# Patient Record
Sex: MALE | Race: WHITE | Employment: OTHER | URBAN - METROPOLITAN AREA
[De-identification: names, ages, dates, MRNs, and addresses within clinical notes are randomized per-mention and may not be internally consistent; named-entity substitution may affect disease eponyms.]

---

## 2021-10-09 PROBLEM — R10.9 ABDOMINAL PAIN: Status: ACTIVE | Noted: 2021-10-09

## 2021-10-10 ENCOUNTER — APPOINTMENT (OUTPATIENT)
Dept: MRI IMAGING | Age: 66
DRG: 251 | End: 2021-10-10
Attending: INTERNAL MEDICINE
Payer: MEDICAID

## 2021-10-10 ENCOUNTER — HOSPITAL ENCOUNTER (INPATIENT)
Age: 66
LOS: 6 days | Discharge: NURSING, REHAB OR LONG-TERM CARE | DRG: 251 | End: 2021-10-16
Attending: INTERNAL MEDICINE | Admitting: INTERNAL MEDICINE
Payer: MEDICAID

## 2021-10-10 PROBLEM — I48.91 ATRIAL FIBRILLATION (HCC): Status: ACTIVE | Noted: 2021-10-10

## 2021-10-10 PROBLEM — G81.94 LEFT HEMIPARESIS (HCC): Status: ACTIVE | Noted: 2021-10-10

## 2021-10-10 PROBLEM — I95.89 HYPOTENSION DUE TO HYPOVOLEMIA: Status: ACTIVE | Noted: 2021-10-10

## 2021-10-10 PROBLEM — I10 ESSENTIAL HYPERTENSION: Status: ACTIVE | Noted: 2021-10-10

## 2021-10-10 PROBLEM — K80.50 CHOLEDOCHOLITHIASIS: Status: ACTIVE | Noted: 2021-10-10

## 2021-10-10 PROBLEM — Z86.73 HISTORY OF COMPLETED STROKE: Status: ACTIVE | Noted: 2021-10-10

## 2021-10-10 PROBLEM — E86.1 HYPOTENSION DUE TO HYPOVOLEMIA: Status: ACTIVE | Noted: 2021-10-10

## 2021-10-10 PROBLEM — I25.10 CORONARY ARTERY DISEASE INVOLVING NATIVE CORONARY ARTERY OF NATIVE HEART WITHOUT ANGINA PECTORIS: Status: ACTIVE | Noted: 2021-10-10

## 2021-10-10 LAB
ALBUMIN SERPL-MCNC: 3 G/DL (ref 3.5–5.2)
ALBUMIN/GLOBULIN RATIO: 1 (ref 1–2.5)
ALP BLD-CCNC: 241 U/L (ref 40–129)
ALT SERPL-CCNC: 119 U/L (ref 5–41)
AMYLASE: 35 U/L (ref 28–100)
ANION GAP SERPL CALCULATED.3IONS-SCNC: 12 MMOL/L (ref 9–17)
AST SERPL-CCNC: 62 U/L
BILIRUB SERPL-MCNC: 9.09 MG/DL (ref 0.3–1.2)
BUN BLDV-MCNC: 15 MG/DL (ref 8–23)
BUN/CREAT BLD: ABNORMAL (ref 9–20)
CALCIUM SERPL-MCNC: 8.6 MG/DL (ref 8.6–10.4)
CHLORIDE BLD-SCNC: 102 MMOL/L (ref 98–107)
CO2: 24 MMOL/L (ref 20–31)
CREAT SERPL-MCNC: 0.63 MG/DL (ref 0.7–1.2)
GFR AFRICAN AMERICAN: >60 ML/MIN
GFR NON-AFRICAN AMERICAN: >60 ML/MIN
GFR SERPL CREATININE-BSD FRML MDRD: ABNORMAL ML/MIN/{1.73_M2}
GFR SERPL CREATININE-BSD FRML MDRD: ABNORMAL ML/MIN/{1.73_M2}
GLUCOSE BLD-MCNC: 89 MG/DL (ref 70–99)
HCT VFR BLD CALC: 35.3 % (ref 40.7–50.3)
HEMOGLOBIN: 11.8 G/DL (ref 13–17)
INR BLD: 1.1
LIPASE: 21 U/L (ref 13–60)
MCH RBC QN AUTO: 32 PG (ref 25.2–33.5)
MCHC RBC AUTO-ENTMCNC: 33.4 G/DL (ref 28.4–34.8)
MCV RBC AUTO: 95.7 FL (ref 82.6–102.9)
NRBC AUTOMATED: 0 PER 100 WBC
PDW BLD-RTO: 14 % (ref 11.8–14.4)
PHENYTOIN FREE: <0.1 UG/ML (ref 1–2)
PLATELET # BLD: ABNORMAL K/UL (ref 138–453)
PLATELET, FLUORESCENCE: NORMAL K/UL (ref 138–453)
PLATELET, IMMATURE FRACTION: NORMAL % (ref 1.1–10.3)
PMV BLD AUTO: ABNORMAL FL (ref 8.1–13.5)
POTASSIUM SERPL-SCNC: 3.7 MMOL/L (ref 3.7–5.3)
PROTHROMBIN TIME: 11.6 SEC (ref 9.1–12.3)
RBC # BLD: 3.69 M/UL (ref 4.21–5.77)
SODIUM BLD-SCNC: 138 MMOL/L (ref 135–144)
TOTAL PROTEIN: 6 G/DL (ref 6.4–8.3)
WBC # BLD: 1.9 K/UL (ref 3.5–11.3)

## 2021-10-10 PROCEDURE — 99222 1ST HOSP IP/OBS MODERATE 55: CPT | Performed by: INTERNAL MEDICINE

## 2021-10-10 PROCEDURE — 82150 ASSAY OF AMYLASE: CPT

## 2021-10-10 PROCEDURE — 87086 URINE CULTURE/COLONY COUNT: CPT

## 2021-10-10 PROCEDURE — A9576 INJ PROHANCE MULTIPACK: HCPCS | Performed by: NURSE PRACTITIONER

## 2021-10-10 PROCEDURE — 36415 COLL VENOUS BLD VENIPUNCTURE: CPT

## 2021-10-10 PROCEDURE — 1200000000 HC SEMI PRIVATE

## 2021-10-10 PROCEDURE — 85610 PROTHROMBIN TIME: CPT

## 2021-10-10 PROCEDURE — 85027 COMPLETE CBC AUTOMATED: CPT

## 2021-10-10 PROCEDURE — 6360000004 HC RX CONTRAST MEDICATION: Performed by: NURSE PRACTITIONER

## 2021-10-10 PROCEDURE — 85055 RETICULATED PLATELET ASSAY: CPT

## 2021-10-10 PROCEDURE — 80186 ASSAY OF PHENYTOIN FREE: CPT

## 2021-10-10 PROCEDURE — 74183 MRI ABD W/O CNTR FLWD CNTR: CPT

## 2021-10-10 PROCEDURE — 6360000002 HC RX W HCPCS: Performed by: NURSE PRACTITIONER

## 2021-10-10 PROCEDURE — 99253 IP/OBS CNSLTJ NEW/EST LOW 45: CPT | Performed by: NURSE PRACTITIONER

## 2021-10-10 PROCEDURE — 2580000003 HC RX 258: Performed by: NURSE PRACTITIONER

## 2021-10-10 PROCEDURE — 80053 COMPREHEN METABOLIC PANEL: CPT

## 2021-10-10 PROCEDURE — 83690 ASSAY OF LIPASE: CPT

## 2021-10-10 RX ORDER — ONDANSETRON 2 MG/ML
4 INJECTION INTRAMUSCULAR; INTRAVENOUS EVERY 6 HOURS PRN
Status: DISCONTINUED | OUTPATIENT
Start: 2021-10-10 | End: 2021-10-17 | Stop reason: HOSPADM

## 2021-10-10 RX ORDER — POTASSIUM BICARBONATE 25 MEQ/1
50 TABLET, EFFERVESCENT ORAL PRN
Status: DISCONTINUED | OUTPATIENT
Start: 2021-10-10 | End: 2021-10-17 | Stop reason: HOSPADM

## 2021-10-10 RX ORDER — SODIUM CHLORIDE 9 MG/ML
25 INJECTION, SOLUTION INTRAVENOUS PRN
Status: DISCONTINUED | OUTPATIENT
Start: 2021-10-10 | End: 2021-10-17 | Stop reason: HOSPADM

## 2021-10-10 RX ORDER — DILTIAZEM HYDROCHLORIDE 180 MG/1
180 CAPSULE, COATED, EXTENDED RELEASE ORAL DAILY
COMMUNITY

## 2021-10-10 RX ORDER — LISINOPRIL 2.5 MG/1
2.5 TABLET ORAL DAILY
Status: ON HOLD | COMMUNITY
End: 2021-10-16 | Stop reason: HOSPADM

## 2021-10-10 RX ORDER — MAGNESIUM SULFATE 1 G/100ML
1000 INJECTION INTRAVENOUS PRN
Status: DISCONTINUED | OUTPATIENT
Start: 2021-10-10 | End: 2021-10-17 | Stop reason: HOSPADM

## 2021-10-10 RX ORDER — DOCUSATE SODIUM 100 MG/1
100 CAPSULE, LIQUID FILLED ORAL DAILY
Status: DISCONTINUED | OUTPATIENT
Start: 2021-10-10 | End: 2021-10-17 | Stop reason: HOSPADM

## 2021-10-10 RX ORDER — POTASSIUM CHLORIDE 7.45 MG/ML
10 INJECTION INTRAVENOUS PRN
Status: DISCONTINUED | OUTPATIENT
Start: 2021-10-10 | End: 2021-10-17 | Stop reason: HOSPADM

## 2021-10-10 RX ORDER — SODIUM CHLORIDE 0.9 % (FLUSH) 0.9 %
10 SYRINGE (ML) INJECTION PRN
Status: DISCONTINUED | OUTPATIENT
Start: 2021-10-10 | End: 2021-10-17 | Stop reason: HOSPADM

## 2021-10-10 RX ORDER — SODIUM CHLORIDE 0.9 % (FLUSH) 0.9 %
5-40 SYRINGE (ML) INJECTION EVERY 12 HOURS SCHEDULED
Status: DISCONTINUED | OUTPATIENT
Start: 2021-10-10 | End: 2021-10-17 | Stop reason: HOSPADM

## 2021-10-10 RX ORDER — ATORVASTATIN CALCIUM 40 MG/1
40 TABLET, FILM COATED ORAL DAILY
COMMUNITY

## 2021-10-10 RX ORDER — ACETAMINOPHEN 650 MG/1
650 SUPPOSITORY RECTAL EVERY 6 HOURS PRN
Status: DISCONTINUED | OUTPATIENT
Start: 2021-10-10 | End: 2021-10-17 | Stop reason: HOSPADM

## 2021-10-10 RX ORDER — FUROSEMIDE 40 MG/1
40 TABLET ORAL DAILY
COMMUNITY

## 2021-10-10 RX ORDER — ASPIRIN 81 MG/1
81 TABLET ORAL DAILY
COMMUNITY

## 2021-10-10 RX ORDER — ACETAMINOPHEN 325 MG/1
650 TABLET ORAL EVERY 4 HOURS PRN
COMMUNITY

## 2021-10-10 RX ORDER — 0.9 % SODIUM CHLORIDE 0.9 %
500 INTRAVENOUS SOLUTION INTRAVENOUS ONCE
Status: DISCONTINUED | OUTPATIENT
Start: 2021-10-10 | End: 2021-10-17 | Stop reason: HOSPADM

## 2021-10-10 RX ORDER — DOCUSATE SODIUM 100 MG/1
100 CAPSULE, LIQUID FILLED ORAL DAILY
COMMUNITY

## 2021-10-10 RX ORDER — OXYCODONE HYDROCHLORIDE AND ACETAMINOPHEN 5; 325 MG/1; MG/1
1 TABLET ORAL 2 TIMES DAILY
Status: ON HOLD | COMMUNITY
End: 2021-10-16 | Stop reason: HOSPADM

## 2021-10-10 RX ORDER — ACETAMINOPHEN 325 MG/1
650 TABLET ORAL EVERY 6 HOURS PRN
Status: DISCONTINUED | OUTPATIENT
Start: 2021-10-10 | End: 2021-10-17 | Stop reason: HOSPADM

## 2021-10-10 RX ORDER — SODIUM CHLORIDE 9 MG/ML
INJECTION, SOLUTION INTRAVENOUS CONTINUOUS
Status: DISCONTINUED | OUTPATIENT
Start: 2021-10-10 | End: 2021-10-12

## 2021-10-10 RX ORDER — ONDANSETRON 4 MG/1
4 TABLET, ORALLY DISINTEGRATING ORAL EVERY 8 HOURS PRN
Status: DISCONTINUED | OUTPATIENT
Start: 2021-10-10 | End: 2021-10-17 | Stop reason: HOSPADM

## 2021-10-10 RX ORDER — POLYETHYLENE GLYCOL 3350 17 G/17G
17 POWDER, FOR SOLUTION ORAL DAILY PRN
Status: DISCONTINUED | OUTPATIENT
Start: 2021-10-10 | End: 2021-10-17 | Stop reason: HOSPADM

## 2021-10-10 RX ORDER — MIRTAZAPINE 15 MG/1
15 TABLET, ORALLY DISINTEGRATING ORAL NIGHTLY
Status: ON HOLD | COMMUNITY
End: 2021-10-16 | Stop reason: HOSPADM

## 2021-10-10 RX ORDER — POTASSIUM CHLORIDE 20 MEQ/1
40 TABLET, EXTENDED RELEASE ORAL PRN
Status: DISCONTINUED | OUTPATIENT
Start: 2021-10-10 | End: 2021-10-17 | Stop reason: HOSPADM

## 2021-10-10 RX ORDER — FAMOTIDINE 20 MG/1
20 TABLET, FILM COATED ORAL DAILY
COMMUNITY

## 2021-10-10 RX ORDER — HYDROXYZINE HYDROCHLORIDE 10 MG/1
10 TABLET, FILM COATED ORAL NIGHTLY PRN
Status: ON HOLD | COMMUNITY
End: 2021-10-16 | Stop reason: HOSPADM

## 2021-10-10 RX ORDER — LORATADINE 10 MG/1
10 CAPSULE, LIQUID FILLED ORAL DAILY
Status: ON HOLD | COMMUNITY
End: 2021-10-16 | Stop reason: HOSPADM

## 2021-10-10 RX ORDER — SODIUM CHLORIDE 0.9 % (FLUSH) 0.9 %
5-40 SYRINGE (ML) INJECTION 2 TIMES DAILY
Status: DISCONTINUED | OUTPATIENT
Start: 2021-10-10 | End: 2021-10-17 | Stop reason: HOSPADM

## 2021-10-10 RX ADMIN — GADOTERIDOL 16 ML: 279.3 INJECTION, SOLUTION INTRAVENOUS at 14:05

## 2021-10-10 RX ADMIN — PIPERACILLIN AND TAZOBACTAM 3375 MG: 3; .375 INJECTION, POWDER, FOR SOLUTION INTRAVENOUS at 12:05

## 2021-10-10 RX ADMIN — PIPERACILLIN AND TAZOBACTAM 3375 MG: 3; .375 INJECTION, POWDER, FOR SOLUTION INTRAVENOUS at 19:48

## 2021-10-10 ASSESSMENT — ENCOUNTER SYMPTOMS
BLOOD IN STOOL: 0
SHORTNESS OF BREATH: 0
PHOTOPHOBIA: 0
ABDOMINAL PAIN: 1
VOMITING: 0
NAUSEA: 0
COUGH: 0
ABDOMINAL DISTENTION: 0
WHEEZING: 0

## 2021-10-10 NOTE — H&P
Nonthrombocytopenic purpura ([facility] [address]     Stroke (cerebrum) [facility])         PSHx:  Past Surgical History:   Procedure Laterality Date    CHOLECYSTECTOMY  12/04/2019    CORONARY ANGIOPLASTY WITH STENT PLACEMENT  12/2017    GASTROSTOMY TUBE PLACEMENT  05/22/2019    KNEE ARTHROSCOPY      TRACHEOSTOMY      TRACHEOSTOMY  05/22/2019        Home Meds:  Prior to Admission medications    Medication Sig Start Date End Date Taking? Authorizing Provider   acetaminophen (TYLENOL) 325 MG tablet Take 650 mg by mouth every 4 hours as needed for Pain   Yes Historical Provider, MD   aspirin EC 81 MG EC tablet Take 81 mg by mouth daily   Yes Historical Provider, MD   atorvastatin (LIPITOR) 40 MG tablet Take 40 mg by mouth daily   Yes Historical Provider, MD   dilTIAZem (CARDIZEM CD) 180 MG extended release capsule Take 180 mg by mouth daily   Yes Historical Provider, MD   vitamin D (CHOLECALCIFEROL) 25 MCG (1000 UT) TABS tablet Take 400 Units by mouth daily   Yes Historical Provider, MD   phenytoin (DILANTIN) 30 MG ER capsule Take 30 mg by mouth daily   Yes Historical Provider, MD   docusate sodium (COLACE) 100 MG capsule Take 100 mg by mouth daily   Yes Historical Provider, MD   furosemide (LASIX) 40 MG tablet Take 40 mg by mouth daily   Yes Historical Provider, MD   hydrOXYzine (ATARAX) 10 MG tablet Take 10 mg by mouth nightly as needed for Itching   Yes Historical Provider, MD   lisinopril (PRINIVIL;ZESTRIL) 2.5 MG tablet Take 2.5 mg by mouth daily   Yes Historical Provider, MD   loratadine (CLARITIN) 10 MG capsule Take 10 mg by mouth daily   Yes Historical Provider, MD   oxyCODONE-acetaminophen (PERCOCET) 5-325 MG per tablet Take 1 tablet by mouth 2 times daily.    Yes Historical Provider, MD   famotidine (PEPCID) 20 MG tablet Take 20 mg by mouth daily   Yes Historical Provider, MD   mirtazapine (REMERON SOL-TAB) 15 MG disintegrating tablet Take 15 mg by mouth nightly   Yes Historical Provider, MD   Bisoprolol Fumarate (ZEBETA PO) Take 20 mg by mouth daily   Yes Historical Provider, MD         Allergies:   Patient has no known allergies. Social Hx:  Tobacco:    reports that he has quit smoking. He has never used smokeless tobacco.  Alcohol:      reports no history of alcohol use. Drug Use:  reports no history of drug use. Family Hx; Family History   Problem Relation Age of Onset    No Known Problems Mother     Heart Disease Father        ROS:  Review of Systems   Constitutional: Positive for activity change (Decreased) and fatigue. Negative for appetite change (Diminished), chills, diaphoresis and fever. HENT: Negative for congestion and nosebleeds. Eyes: Negative for photophobia and visual disturbance. Respiratory: Negative for cough, shortness of breath and wheezing. Cardiovascular: Negative for chest pain and palpitations. Gastrointestinal: Positive for abdominal pain (Right upper quadrant). Negative for abdominal distention, blood in stool, nausea and vomiting. Genitourinary: Negative for flank pain and hematuria. Musculoskeletal: Negative for arthralgias and myalgias. Skin: Negative for rash and wound. Neurological: Positive for weakness (Left hemiparesis following prior stroke). Negative for dizziness and light-headedness. Physical Exam:  Vitals:  BP 92/67   Pulse 78   Temp 97.8 °F (36.6 °C) (Oral)   Resp 18   Ht 6' (1.829 m)   Wt 180 lb 6.4 oz (81.8 kg)   SpO2 97%   BMI 24.47 kg/m²   Temp (24hrs), Av.8 °F (36.6 °C), Min:97.8 °F (36.6 °C), Max:97.8 °F (36.6 °C)    Physical Exam  Vitals reviewed. Constitutional:       General: He is not in acute distress. Appearance: He is ill-appearing. He is not diaphoretic. HENT:      Head: Normocephalic. Nose: Nose normal.   Eyes:      General: Scleral icterus present. Conjunctiva/sclera: Conjunctivae normal.   Neck:      Trachea: No tracheal deviation.       Comments: Prior tracheostomy site stable  Cardiovascular:      Rate and Rhythm: Normal rate and regular rhythm. Pulmonary:      Effort: Pulmonary effort is normal. No respiratory distress. Breath sounds: Normal breath sounds. No wheezing or rales. Chest:      Chest wall: No tenderness. Abdominal:      General: Bowel sounds are normal. There is no distension. Palpations: Abdomen is soft. Tenderness: There is abdominal tenderness (Right upper quadrant, negative Davila's sign). Comments: G-tube site stable   Musculoskeletal:         General: No tenderness. Cervical back: Neck supple. Comments: Decreased muscle mass   Skin:     General: Skin is warm and dry. Coloration: Skin is jaundiced.    Neurological:      Comments: Having trouble with detailed historical data  Moving extremities x4  There is residual left hemiparesis           Data:  Laboratory Testing:  Recent Results (from the past 24 hour(s))   CBC    Collection Time: 10/10/21 10:23 AM   Result Value Ref Range    WBC 1.9 (L) 3.5 - 11.3 k/uL    RBC 3.69 (L) 4.21 - 5.77 m/uL    Hemoglobin 11.8 (L) 13.0 - 17.0 g/dL    Hematocrit 35.3 (L) 40.7 - 50.3 %    MCV 95.7 82.6 - 102.9 fL    MCH 32.0 25.2 - 33.5 pg    MCHC 33.4 28.4 - 34.8 g/dL    RDW 14.0 11.8 - 14.4 %    Platelets See Reflexed IPF Result 138 - 453 k/uL    MPV NOT REPORTED 8.1 - 13.5 fL    NRBC Automated 0.0 0.0 per 100 WBC   Comprehensive Metabolic Panel w/ Reflex to MG    Collection Time: 10/10/21 10:23 AM   Result Value Ref Range    Glucose 89 70 - 99 mg/dL    BUN 15 8 - 23 mg/dL    CREATININE 0.63 (L) 0.70 - 1.20 mg/dL    Bun/Cre Ratio NOT REPORTED 9 - 20    Calcium 8.6 8.6 - 10.4 mg/dL    Sodium 138 135 - 144 mmol/L    Potassium 3.7 3.7 - 5.3 mmol/L    Chloride 102 98 - 107 mmol/L    CO2 24 20 - 31 mmol/L    Anion Gap 12 9 - 17 mmol/L    Alkaline Phosphatase 241 (H) 40 - 129 U/L     (H) 5 - 41 U/L    AST 62 (H) <40 U/L    Total Bilirubin 9.09 (H) 0.3 - 1.2 mg/dL    Total Protein 6.0 (L) 6.4 - 8.3 g/dL    Albumin 3.0 (L) 3.5 - 5.2 g/dL Albumin/Globulin Ratio 1.0 1.0 - 2.5    GFR Non-African American >60 >60 mL/min    GFR African American >60 >60 mL/min    GFR Comment          GFR Staging NOT REPORTED    LIPASE    Collection Time: 10/10/21 10:23 AM   Result Value Ref Range    Lipase 21 13 - 60 U/L   AMYLASE    Collection Time: 10/10/21 10:23 AM   Result Value Ref Range    Amylase 35 28 - 100 U/L   Protime-INR    Collection Time: 10/10/21 10:23 AM   Result Value Ref Range    Protime 11.6 9.1 - 12.3 sec    INR 1.1    Immature Platelet Fraction    Collection Time: 10/10/21 10:23 AM   Result Value Ref Range    Platelet, Immature Fraction NOT REPORTED 1.1 - 10.3 %    Platelet, Fluorescence Platelet clumps present, count appears decreased. 138 - 453 k/uL       Imaging/Diagnostics:  No results found.     Assessment:  Primary Problem  Choledocholithiasis    Active Hospital Problems    Diagnosis Date Noted    Choledocholithiasis [K80.50] 10/10/2021    Hypotension due to hypovolemia [I95.89, E86.1] 10/10/2021    History of completed stroke with residual left hemiparesis [Z86.73] 10/10/2021    Left hemiparesis ([address] [G81.94] 10/10/2021    Essential hypertension [I10] 10/10/2021    Atrial fibrillation ([address] [I48.91] 10/10/2021    Coronary artery disease involving native coronary artery of native heart without angina pectoris [I25.10] 10/10/2021    Stroke (cerebrum) ([address] [I63.9]     Nonthrombocytopenic purpura ([address] [D69.2]     Obstructive jaundice [K83.1]     Leukopenia [D72.819]     Anemia, normocytic normochromic [D64.9]     Abdominal pain [R10.9] 10/09/2021       Plan:  Admit  GI consultation  MRCP pending  Pain control  Aspirin on hold  Blood Pressure - Monitor and control  Antihypertensives on hold  Diuretics on hold  Hydration  Pressors as needed  Antibiotics: Zosyn  Observe for cholangitis  Blood products as needed  Hematology evaluation and follow-up  Check EKG  Review [facility] from [facility] when available  The patient's status and

## 2021-10-10 NOTE — PLAN OF CARE
Problem: Infection:  Goal: Will remain free from infection  Description: Will remain free from infection  Outcome: Ongoing     Problem: Safety:  Goal: Free from accidental physical injury  Description: Free from accidental physical injury  Outcome: Ongoing  Goal: Free from intentional harm  Description: Free from intentional harm  Outcome: Ongoing     Problem: Daily Care:  Goal: Daily care needs are met  Description: Daily care needs are met  Outcome: Ongoing     Problem: Pain:  Goal: Patient's pain/discomfort is manageable  Description: Patient's pain/discomfort is manageable  Outcome: Ongoing     Problem: Skin Integrity:  Goal: Skin integrity will stabilize  Description: Skin integrity will stabilize  Outcome: Ongoing     Problem: Discharge Planning:  Goal: Patients continuum of care needs are met  Description: Patients continuum of care needs are met  Outcome: Ongoing     Problem: Falls - Risk of:  Goal: Will remain free from falls  Description: Will remain free from falls  Outcome: Ongoing  Goal: Absence of physical injury  Description: Absence of physical injury  Outcome: Ongoing     Problem: Skin Integrity:  Goal: Will show no infection signs and symptoms  Description: Will show no infection signs and symptoms  Outcome: Ongoing  Goal: Absence of new skin breakdown  Description: Absence of new skin breakdown  Outcome: Ongoing

## 2021-10-10 NOTE — CONSULTS
THE [facility]  Consultation Note     . REASON FOR CONSULTATION:    CBD dilatation  Evaluate for ERCP    HISTORY OF PRESENT ILLNESS:     This is a 61 y.o. male with PMH including CVA who originally presented to [facility] with complaints of right upper quadrant pain and transferred to [address] [place] [address] Dr RUSS for suspicions of choledochal lithiasis. Patient denies nausea vomiting fever chills. CT scan at [address] [place] [address] showed a 1.5 cm dilated CBD duct with intra and extrahepatic ductal dilatation. Blood work showed transaminasemia Prior to transfer patient was started on Zosyn. Apparently overnight patient had required Levophed prior to transfer  Patient has been living in an ECF for the last 3 years due to CVA. He previously had trach and PEG.    Patient is alert and oriented able to verbalize few words but has limited communication due to previous stroke    Summary of imaging completed at this time:  MRI MRCP pending      Summary of abnormal labs completed at this time:    Metabolic: Unremarkable  Hematology: Hemoglobin was 17 at [facility] and is 11 here-question validity of blood sample  Coags: INR 1.1  Liver profile: T bili was 9 at [facility] and 9 here, , , AST 62, amylase 35, lipase 21      Previous GI history:   Patient has had multiple ERCPs in the past with open cholecystectomy 2019  Unclear if patient has had EGD or colonoscopies in the past-no record in epic or care everywhere  No personal history or family history of GI malignancy  Past Medical/Social/Family History:  Past Medical History:   Diagnosis Date    Atrial fibrillation ([address]     CAD (coronary artery disease)     CHF (congestive heart failure) ([address]     Depression     Hypertension     Nonthrombocytopenic purpura ([address]     Stroke (cerebrum) ([facility] [address]      Past Surgical History:   Procedure Laterality Date    CHOLECYSTECTOMY  12/04/2019    CORONARY ANGIOPLASTY WITH STENT PLACEMENT  12/2017    GASTROSTOMY TUBE PLACEMENT 05/22/2019    KNEE ARTHROSCOPY      TRACHEOSTOMY      TRACHEOSTOMY  05/22/2019     Family History   Problem Relation Age of Onset    No Known Problems Mother     Heart Disease Father      Previous records/history/ and notes were reviewed    Allergies:  No Known Allergies    Home medications:  Prior to Admission medications    Medication Sig Start Date End Date Taking? Authorizing Provider   acetaminophen (TYLENOL) 325 MG tablet Take 650 mg by mouth every 4 hours as needed for Pain   Yes Historical Provider, MD   aspirin EC 81 MG EC tablet Take 81 mg by mouth daily   Yes Historical Provider, MD   atorvastatin (LIPITOR) 40 MG tablet Take 40 mg by mouth daily   Yes Historical Provider, MD   dilTIAZem (CARDIZEM CD) 180 MG extended release capsule Take 180 mg by mouth daily   Yes Historical Provider, MD   vitamin D (CHOLECALCIFEROL) 25 MCG (1000 UT) TABS tablet Take 400 Units by mouth daily   Yes Historical Provider, MD   phenytoin (DILANTIN) 30 MG ER capsule Take 30 mg by mouth daily   Yes Historical Provider, MD   docusate sodium (COLACE) 100 MG capsule Take 100 mg by mouth daily   Yes Historical Provider, MD   furosemide (LASIX) 40 MG tablet Take 40 mg by mouth daily   Yes Historical Provider, MD   hydrOXYzine (ATARAX) 10 MG tablet Take 10 mg by mouth nightly as needed for Itching   Yes Historical Provider, MD   lisinopril (PRINIVIL;ZESTRIL) 2.5 MG tablet Take 2.5 mg by mouth daily   Yes Historical Provider, MD   loratadine (CLARITIN) 10 MG capsule Take 10 mg by mouth daily   Yes Historical Provider, MD   oxyCODONE-acetaminophen (PERCOCET) 5-325 MG per tablet Take 1 tablet by mouth 2 times daily.    Yes Historical Provider, MD   famotidine (PEPCID) 20 MG tablet Take 20 mg by mouth daily   Yes Historical Provider, MD   mirtazapine (REMERON SOL-TAB) 15 MG disintegrating tablet Take 15 mg by mouth nightly   Yes Historical Provider, MD   Bisoprolol Fumarate (ZEBETA PO) Take 20 mg by mouth daily   Yes Historical Provider, MD     .  Current Medications:  Scheduled Meds:   sodium chloride flush  5-40 mL IntraVENous 2 times per day    enoxaparin  40 mg SubCUTAneous Daily    piperacillin-tazobactam  3,375 mg IntraVENous Q8H    phenytoin  30 mg Oral Daily    docusate sodium  100 mg Oral Daily     . Continuous Infusions:   sodium chloride      sodium chloride 125 mL/hr at 10/10/21 1339     . PRN Meds:sodium chloride flush, sodium chloride, potassium chloride **OR** potassium alternative oral replacement **OR** potassium chloride, magnesium sulfate, ondansetron **OR** ondansetron, polyethylene glycol, acetaminophen **OR** acetaminophen    REVIEW OF SYSTEMS:     Constitutional: No fever, no chills, no lethargy, no weakness, no weight loss  HEENT:  No headache, otalgia, itchy eyes, nasal discharge or sore throat. Cardiac:  No chest pain, dyspnea, orthopnea or PND. Chest:   No cough, phlegm or wheezing. Abdomen:  Right upper quadrant pain  Neuro:  No focal weakness, abnormal movements or seizure like activity. Skin:   No rashes, no itching. :   No hematuria, no pyuria, no dysuria, no flank pain. Extremities:  No swelling or joint pains. ROS was otherwise negative except as mentioned in the [address] PHYSICAL EXAM:    BP (!) 96/58   Pulse 78   Temp 97.8 °F (36.6 °C) (Oral)   Resp 18   Ht 6' (1.829 m)   Wt 180 lb 6.4 oz (81.8 kg)   SpO2 97%   BMI 24.47 kg/m²   . TMAX[24]    General: Elderly  Head:  Normocephalic, Atraumatic  EENT: EOMI, Sclera not icteric, Oropharynx moist  Neck:  Supple, Trachea midline  Lungs:CTA Bilaterally  Heart: RRR, No murmur, No rub, No gallop, PMI nondisplaced. Abdomen:Soft, Non tender, Not distended, BS WNL,  No masses. No hepatomegalia   Ext:No clubbing. No cyanosis. No edema. Skin: No rashes. No jaundice. No stigmata of liver disease. Neuro:  A&O x Three, No focal neurological deficits    Imaging:       Hemotological labs:    Anemia studies:  No results for input(s): LABIRON, TIBC, FERRITIN, [id number], FOLATE, OCCULTBLD in the last 72 hours. CBC:  Recent Labs     10/10/21  1023   WBC 1.9*   HGB 11.8*   MCV 95.7   RDW 14.0   PLT See Reflexed IPF Result       PT/INR:  Recent Labs     10/10/21  1023   PROTIME 11.6   INR 1.1       BMP:  Recent Labs     10/10/21  1023      K 3.7      CO2 24   BUN 15   CREATININE 0.63*   GLUCOSE 89   CALCIUM 8.6       Liver work up:  Hepatitis Functional Panel:  Recent Labs     10/10/21  1023   ALKPHOS 241*   *   AST 62*   PROT 6.0*   BILITOT 9.09*   LABALBU 3.0*       Amylase/Lipase/Ammonia:  Recent Labs     10/10/21  1023   AMYLASE 35   LIPASE 21       Acute Hepatitis Panel:  No results found for: HEPBSAG, HEPCAB, HEPBIGM, HEPAIGM         Principal Problem:    Choledocholithiasis  Active Problems:    Abdominal pain    Hypotension due to hypovolemia    History of completed stroke with residual left hemiparesis    Left hemiparesis (HCC)    Essential hypertension    Atrial fibrillation (HCC)    Coronary artery disease involving native coronary artery of native heart without angina pectoris    Stroke (cerebrum) (HCC)    Nonthrombocytopenic purpura (HCC)    Obstructive jaundice    Leukopenia    Anemia, normocytic normochromic  Resolved Problems:    * No resolved hospital problems. *       GI Impression and recommendations and plan:    56-year-old male with suspected choledocholithiasis and CBD dilatation on CT from [facility] with elevated transaminase and T bili. Awaiting MRI MRCP as patient made need repeat ERCP tomorrow. 1. Clear liquid diet-n.p.o. at midnight  2. Hold Lovenox  3. Continue Zosyn 3.375 mg IV every 8 hours  4. Continue supportive care with IV fluids antiemetics pain medication as needed  5. Monitor closely for signs of hypotension-low threshold to send to ICU if pressor support is required   6. Daily labs including CBC BMP LFT  7.  We will follow up on MRI MRCP later this afternoon    This plan was formulated in collaboration with Dr. Diane Almeida MD      Electronically signed by:  FINA Bloom - CNP [id number] [facility]  [phone]  10/10/2021    1:53 PM     This note was created with the assistance of a speech-recognition program.  Although the intention is to generate a document that actually reflects the content of the visit, no guarantees can be provided that every mistake has been identified and corrected by editing.

## 2021-10-11 LAB
ALBUMIN SERPL-MCNC: 2.7 G/DL (ref 3.5–5.2)
ALBUMIN/GLOBULIN RATIO: 1.1 (ref 1–2.5)
ALP BLD-CCNC: 192 U/L (ref 40–129)
ALT SERPL-CCNC: 90 U/L (ref 5–41)
ANION GAP SERPL CALCULATED.3IONS-SCNC: 10 MMOL/L (ref 9–17)
AST SERPL-CCNC: 54 U/L
BILIRUB SERPL-MCNC: 4.29 MG/DL (ref 0.3–1.2)
BILIRUBIN DIRECT: 3.34 MG/DL
BILIRUBIN, INDIRECT: 0.95 MG/DL (ref 0–1)
BUN BLDV-MCNC: 12 MG/DL (ref 8–23)
CALCIUM SERPL-MCNC: 8.1 MG/DL (ref 8.6–10.4)
CHLORIDE BLD-SCNC: 107 MMOL/L (ref 98–107)
CO2: 23 MMOL/L (ref 20–31)
CREAT SERPL-MCNC: 0.65 MG/DL (ref 0.7–1.2)
CULTURE: NO GROWTH
GFR AFRICAN AMERICAN: >60 ML/MIN
GFR NON-AFRICAN AMERICAN: >60 ML/MIN
GFR SERPL CREATININE-BSD FRML MDRD: ABNORMAL ML/MIN/{1.73_M2}
GFR SERPL CREATININE-BSD FRML MDRD: ABNORMAL ML/MIN/{1.73_M2}
GLUCOSE BLD-MCNC: 86 MG/DL (ref 70–99)
HCT VFR BLD CALC: 40.8 % (ref 40.7–50.3)
HEMOGLOBIN: 13.3 G/DL (ref 13–17)
Lab: NORMAL
MCH RBC QN AUTO: 31.7 PG (ref 25.2–33.5)
MCHC RBC AUTO-ENTMCNC: 32.6 G/DL (ref 28.4–34.8)
MCV RBC AUTO: 97.1 FL (ref 82.6–102.9)
NRBC AUTOMATED: 0 PER 100 WBC
PDW BLD-RTO: 14.6 % (ref 11.8–14.4)
PLATELET # BLD: ABNORMAL K/UL (ref 138–453)
PLATELET, FLUORESCENCE: 131 K/UL (ref 138–453)
PLATELET, IMMATURE FRACTION: 4.3 % (ref 1.1–10.3)
PMV BLD AUTO: ABNORMAL FL (ref 8.1–13.5)
POTASSIUM SERPL-SCNC: 4.1 MMOL/L (ref 3.7–5.3)
RBC # BLD: 4.2 M/UL (ref 4.21–5.77)
SODIUM BLD-SCNC: 140 MMOL/L (ref 135–144)
SPECIMEN DESCRIPTION: NORMAL
TOTAL PROTEIN: 5.2 G/DL (ref 6.4–8.3)
WBC # BLD: 6.4 K/UL (ref 3.5–11.3)

## 2021-10-11 PROCEDURE — 36415 COLL VENOUS BLD VENIPUNCTURE: CPT

## 2021-10-11 PROCEDURE — 97530 THERAPEUTIC ACTIVITIES: CPT

## 2021-10-11 PROCEDURE — 6360000002 HC RX W HCPCS: Performed by: NURSE PRACTITIONER

## 2021-10-11 PROCEDURE — 1200000000 HC SEMI PRIVATE

## 2021-10-11 PROCEDURE — 97535 SELF CARE MNGMENT TRAINING: CPT

## 2021-10-11 PROCEDURE — 82248 BILIRUBIN DIRECT: CPT

## 2021-10-11 PROCEDURE — 97162 PT EVAL MOD COMPLEX 30 MIN: CPT

## 2021-10-11 PROCEDURE — 99232 SBSQ HOSP IP/OBS MODERATE 35: CPT | Performed by: INTERNAL MEDICINE

## 2021-10-11 PROCEDURE — 85055 RETICULATED PLATELET ASSAY: CPT

## 2021-10-11 PROCEDURE — 99232 SBSQ HOSP IP/OBS MODERATE 35: CPT | Performed by: STUDENT IN AN ORGANIZED HEALTH CARE EDUCATION/TRAINING PROGRAM

## 2021-10-11 PROCEDURE — 80053 COMPREHEN METABOLIC PANEL: CPT

## 2021-10-11 PROCEDURE — 2580000003 HC RX 258: Performed by: NURSE PRACTITIONER

## 2021-10-11 PROCEDURE — 6370000000 HC RX 637 (ALT 250 FOR IP): Performed by: INTERNAL MEDICINE

## 2021-10-11 PROCEDURE — 97167 OT EVAL HIGH COMPLEX 60 MIN: CPT

## 2021-10-11 PROCEDURE — 85027 COMPLETE CBC AUTOMATED: CPT

## 2021-10-11 RX ADMIN — PIPERACILLIN AND TAZOBACTAM 3375 MG: 3; .375 INJECTION, POWDER, FOR SOLUTION INTRAVENOUS at 21:14

## 2021-10-11 RX ADMIN — PIPERACILLIN AND TAZOBACTAM 3375 MG: 3; .375 INJECTION, POWDER, FOR SOLUTION INTRAVENOUS at 03:18

## 2021-10-11 RX ADMIN — SODIUM CHLORIDE, PRESERVATIVE FREE 20 ML: 5 INJECTION INTRAVENOUS at 10:01

## 2021-10-11 RX ADMIN — PIPERACILLIN AND TAZOBACTAM 3375 MG: 3; .375 INJECTION, POWDER, FOR SOLUTION INTRAVENOUS at 12:56

## 2021-10-11 RX ADMIN — DOCUSATE SODIUM 100 MG: 100 CAPSULE ORAL at 09:59

## 2021-10-11 RX ADMIN — SODIUM CHLORIDE, PRESERVATIVE FREE 10 ML: 5 INJECTION INTRAVENOUS at 10:01

## 2021-10-11 RX ADMIN — EXTENDED PHENYTOIN SODIUM 30 MG: 30 CAPSULE ORAL at 09:59

## 2021-10-11 ASSESSMENT — PAIN SCALES - GENERAL: PAINLEVEL_OUTOF10: 0

## 2021-10-11 NOTE — PROGRESS NOTES
THE [facility] AT [facility]   Progress Note    Birdie Sin is a 61 y.o. male patient. Hospitalization Day:1      Chief consult reason:     CBD dilatation  Evaluate for ERCP    Subjective:  Patient seen and examined. No acute events overnight. VSS-afebrile. Patient denies any abdominal pain nausea or vomiting. Bilirubin greatly improved today from 9.09-4.29, -192, AST 62-34  No leukocytosis noted, hemoglobin stable 13.3  Patient had negative MRCP with no signs of choledochal lithiasis-most likely patient passed stone    VITALS:  /80   Pulse 89   Temp 97.8 °F (36.6 °C) (Oral)   Resp 24   Ht 6' (1.829 m)   Wt 180 lb 6.4 oz (81.8 kg)   SpO2 99%   BMI 24.47 kg/m²   TEMPERATURE:  Current - Temp: 97.8 °F (36.6 °C); Max - Temp  Av.9 °F (36.6 °C)  Min: 97.5 °F (36.4 °C)  Max: 98.2 °F (36.8 °C)    Physical Assessment:  General appearance:  alert, cooperative and no distress  Mental Status:  oriented to person, place and time and normal affect  Lungs:  clear to auscultation bilaterally, normal effort  Heart:  regular rate and rhythm, no murmur  Abdomen:  soft, nontender, nondistended, normal bowel sounds, no masses, hepatomegaly, splenomegaly  Extremities:  no edema, redness, tenderness in the calves  Skin:  no gross lesions, rashes, induration    Data Review:    Labs and Imaging:     CBC:  Recent Labs     10/10/21  1023 10/11/21  0706   WBC 1.9* 6.4   HGB 11.8* 13.3   MCV 95.7 97.1   RDW 14.0 14.6*   PLT See Reflexed IPF Result See Reflexed IPF Result       ANEMIA STUDIES:  No results for input(s): LABIRON, TIBC, FERRITIN, [id number], FOLATE, OCCULTBLD in the last 72 hours.     BMP:  Recent Labs     10/10/21  1023 10/11/21  0706    140   K 3.7 4.1    107   CO2 24 23   BUN 15 12   CREATININE 0.63* 0.65*   GLUCOSE 89 86   CALCIUM 8.6 8.1*       LFTS:  Recent Labs     10/10/21  1023 10/11/21  0706   ALKPHOS 241* 192*   * 90*   AST 62* 54*   BILITOT 9.09* 4.29*   BILIDIR  -- 3.34*   LABALBU 3.0* 2.7*       Amylase/Lipase and Ammonia:  Recent Labs     10/10/21  1023   AMYLASE 35   LIPASE 21       Acute Hepatitis Panel:  No results found for: HEPBSAG, HEPCAB, HEPBIGM, HEPAIGM    HCV Genotype:  No results found for: HEPATITISCGENOTYPE    HCV Quantitative:  No results found for: HCVQNT    LIVER WORK UP:    AFP  No results found for: AFP    Alpha 1 antitrypsin   No results found for: A1A    JOANA  No results found for: JOANA    AMA  No results found for: MITOAB    ASMA  No results found for: SMOOTHMUSCAB    PT/INR  Recent Labs     10/10/21  1023   PROTIME 11.6   INR 1.1       Cancer Markers:  CEA:  No results for input(s): CEA in the last 72 hours. Ca 125:  No results for input(s):  in the last 72 hours. Ca 19-9:   Invalid input(s):   AFP: No results for input(s): AFP in the last 72 hours. Lactic acid:Invalid input(s): LACTIC ACID    Radiology Review:    No results found. Principal Problem:    Choledocholithiasis  Active Problems:    Abdominal pain    Hypotension due to hypovolemia    History of completed stroke with residual left hemiparesis    Left hemiparesis (HCC)    Essential hypertension    Atrial fibrillation (HCC)    Coronary artery disease involving native coronary artery of native heart without angina pectoris    Stroke (cerebrum) (HCC)    Nonthrombocytopenic purpura (HCC)    Obstructive jaundice    Leukopenia    Anemia, normocytic normochromic  Resolved Problems:    * No resolved hospital problems. *       GI Impression:    1. Choledocholithiasis-T bili greatly improved from 94 indicative that patient passed stone. MRCP is negative for biliary obstruction stricture or other abnormalities. Patient is asymptomatic  2. Elevated transaminase/bilirubin-greatly improved today      Plan and Recommendations:    1. No indications for ERCP at this time is most likely patient has passed stone evidenced by improved LFTs, negative MRI, and patient is asymptomatic  2.  Soft diet  3. Trend daily labs including CBC BMP LFT  4. Continue supportive care with IV fluids, antiemetics, pain meds as needed, and Zosyn  5. If LFTs continue to decline tomorrow anticipate discharge in afternoon  6. We will follow      This plan was formulated in collaboration with Dr. Tee Felipe MD    Thank you for allowing me to participate in the care of your patient. Please feel free to contact me with any questions or concerns. [facility]    This note was created with the assistance of a speech-recognition program.  Although the intention is to generate a document that actually reflects the content of the visit, no guarantees can be provided that every mistake has been identified and corrected by editing.

## 2021-10-11 NOTE — PROGRESS NOTES
Occupational Therapy   Occupational Therapy Initial Assessment  Date: 10/11/2021   Patient Name: Jatinder Rhodes  MRN: [id number]     : 1955    Chief complaint copied from Internal Medicine: This is a 61 y.o. male with PMH including CVA who originally presented to [facility] with complaints of right upper quadrant pain and transferred to [address] [place] [address] Dr RUSS for suspicions of choledochal lithiasis. Patient denies nausea vomiting fever chills. Date of Service: 10/11/2021    Discharge Recommendations: Further therapy recommended at discharge. OT Equipment Recommendations  Equipment Needed: No    Assessment   Performance deficits / Impairments: Decreased functional mobility ; Decreased safe awareness;Decreased balance;Decreased coordination;Decreased ADL status; Decreased ROM; Decreased endurance;Decreased high-level IADLs;Decreased fine motor control  Assessment: Pt agreeable to therapy this date and pleasant/cooperative throughout. Pt lying supine in bed upon entry. Pt presents with L hemiparesis d/t stroke 3 years ago, not verbalizing any numbness/tingling in LUE but edema present and elevated with pillow. Pt Max x 2 for bed mobility and sitting EOB. Pt completed self grooming task (combing hair and washing face) supine in bed with setup and increased time. Pt retired lying supine with all lines connected, call button within reach, and nurse notified. Pt would continue to benefit from OT services to address deficits in safety awareness, balance, coordination, endurance, ROM/strength, FM control to increase functional independence in ADLs/IADLs and functional mobility/transfers. Prognosis: Good  Decision Making: High Complexity  Patient Education: OT Role, OT POC, safety awareness, and edema management. Pt demonstrated good return.   REQUIRES OT FOLLOW UP: Yes  Activity Tolerance  Activity Tolerance: Patient Tolerated treatment well  Safety Devices  Safety Devices in place: Yes  Type of devices: Bed alarm in place;Nurse notified; Left in bed;Call light within reach           Patient Diagnosis(es): There were no encounter diagnoses. has a past medical history of Atrial fibrillation ([facility] [address] CAD (coronary artery disease), CHF (congestive heart failure) ([facility] [address] Depression, Hypertension, Nonthrombocytopenic purpura ([facility] [address] and Stroke (cerebrum) ([facility] [address] has a past surgical history that includes Cholecystectomy (12/04/2019); Gastrostomy tube placement (05/22/2019); tracheostomy; tracheostomy (05/22/2019); Knee arthroscopy; and Coronary angioplasty with stent (12/2017). Restrictions  Restrictions/Precautions  Restrictions/Precautions: Fall Risk  Required Braces or Orthoses?: No  Position Activity Restriction  Other position/activity restrictions: up with assist    Subjective   General  Patient assessed for rehabilitation services?: Yes  Family / Caregiver Present: No  General Comment  Comments: RN ok'd pt for OT eval this date. Pt. agreeable to therapy and cooperative/pleasant throughout.   Patient Currently in Pain: Denies  Vital Signs  Oxygen Therapy  O2 Device: Nasal cannula  O2 Flow Rate (L/min): 2 L/min  Social/Functional History  Social/Functional History  Lives With: Other (comment)  Type of Home: Facility  Bathroom Shower/Tub: Walk-in shower (Pt reports getting wheeled into shower)  Bathroom Equipment: Grab bars around toilet  Bathroom Accessibility: Wheelchair accessible  Home Equipment: [address] Help From: Other (comment) (Nursing home staff)  ADL Assistance: Needs assistance  Bath: Dependent/Total  Dressing: Dependent/Total  Grooming: Modified independent  (Requires setup)  Feeding: Modified independent  (Requires setup)  Toileting: Needs assistance  Homemaking Assistance: Needs assistance  Homemaking Responsibilities: No  Ambulation Assistance: Needs assistance  Transfer Assistance: Needs assistance  Active : No  Occupation: Retired  Type of occupation: automechanic  Leisure & Hobbies: Pt enjoys playing games (candy crush) on laptop while lying in bed  Additional Comments: Pt has been residing at [facility] for last 3 years since stroke, pt reports staying in room by choice 90% of time       Objective   Vision: Impaired  Vision Exceptions: Wears glasses for distance (glasses not present at hospital)  Hearing: Exceptions to [facility]  Hearing Exceptions: Hard of hearing/hearing concerns (R ear hearing difficulty)    Orientation  Overall Orientation Status: Within Functional Limits (Ox4)     Balance  Sitting Balance: Maximum assistance (Max A sitting EOB ~5min)  Standing Balance: Unable to assess(comment) (unable to assess d/t L hemiparesis)     ADL  Feeding: Modified independent ;Setup; Increased time to complete  Grooming: Modified independent ;Setup; Increased time to complete (Pt able to comb hair, wash face, oral hygiene (sponge) with setup and increased time. Pt demonstrated L neglect while combing hair.)  UE Bathing: Maximum assistance;Setup (Pt able to wash/dry face with setup and increased time)  LE Bathing: Maximum assistance;Setup  UE Dressing: Maximum assistance;Setup  LE Dressing: Dependent/Total  Toileting: Dependent/Total  Tone RUE  RUE Tone: Normotonic  Tone LUE  LUE Tone: Hypotonic  Tone Description: Flaccid  Coordination  Coordination and Movement description: Fine motor impairments; Left UE;Decreased speed;Decreased accuracy;Gross motor impairments     Bed mobility  Rolling to Left: Maximum assistance;2 Person assistance (Max x 2 for linen adjustment)  Rolling to Right: 2 Person assistance;Maximum assistance (Max x 2 in prep for sitting EOB; linen adjustment)  Supine to Sit: 2 Person assistance;Maximum assistance (Max x 2 for BLE and trunk progression)  Sit to Supine: Maximum assistance;2 Person assistance (Max x 2 for trunk and BLE progression)  Scootin Person assistance;Maximal assistance  Transfers  Transfer Comments: Pt unable to assess transfers this date d/t NWB and non-ambulatory since stroke. Pt reports using lou steady at facility to transfer EOB<>w/c. Cognition  Overall Cognitive Status: WFL        Sensation  Overall Sensation Status: WFL        LUE PROM (degrees)  LUE PROM: Exceptions  L Shoulder Flex  0-180: 0-50; all other joints WFL  Left Hand AROM (degrees)  Left Hand AROM: WFL  RUE AROM (degrees)  RUE AROM : WFL  Right Hand AROM (degrees)  Right Hand AROM: WFL  LUE Strength  Gross LUE Strength: Exceptions to [facility]  L Shoulder Flex: 1/5  L Shoulder Ext: 1/5  L Elbow Flex: 2+/5  L Elbow Ext: 2+/5  L Wrist Flex: 3/5  L Wrist Ext: 3/5  L Hand General: 3/5  RUE Strength  Gross RUE Strength: WFL  R Hand General: 4+/5  RUE Strength Comment: Grossly 4/5    Plan   Plan  Times per week: 3-4x/wk  Current Treatment Recommendations: Strengthening, ROM, Balance Training, Functional Mobility Training, Endurance Training, Safety Education & Training, Pain Management, Self-Care / ADL, Patient/Caregiver Education & Training    AM-PAC Score        AM-[facility] Inpatient Daily Activity Raw Score: 15 (10/11/21 1119)  AM-PAC Inpatient ADL T-Scale Score : 34.69 (10/11/21 1119)  ADL Inpatient CMS 0-100% Score: 56.46 (10/11/21 1119)  ADL Inpatient CMS G-Code Modifier : CK (10/11/21 1119)    Goals  Short term goals  Time Frame for Short term goals: By discharge, pt will:  Short term goal 1: demo grooming with <2 VCs for attention to left side  Short term goal 2: track to the left side with <2 VCs  Short term goal 3: demo bed mob with Mod A x 2 to decrease risk of pressure injuries  Short term goal 4: demo static sitting balance functional task for 6+ min with Mod A  Short term goal 5: demo LUE awareness during functional task with <3 VC/TC.        Therapy Time   Individual Concurrent Group Co-treatment   Time In 2761         Time Out 0925         Minutes 38         Timed Code Treatment Minutes: 28 Minutes       [place], [place]

## 2021-10-11 NOTE — PROGRESS NOTES
Physical Therapy    Facility/Department: [address] ORTHO/MED SURG  Initial Assessment    NAME: Francisco Javier Chin  : 1955  MRN: [id number]    Date of Service: 10/11/2021  This is a 61 y. o. male with PMH including CVA who originally presented to [facility] with complaints of right upper quadrant pain and transferred to [facility] for suspicions of choledochal lithiasis. Patient denies nausea vomiting fever chills.   Discharge Recommendations:  Patient would benefit from continued therapy after discharge   PT Equipment Recommendations  Equipment Needed: Yes  Mobility Devices: Canes  Cane: Straight Cane  Assessment   Body structures, Functions, Activity limitations: Decreased functional mobility ; Decreased strength;Decreased endurance  Assessment: The pt transferred supine to sit with max assist. He had poor tolerance to activity. He could benefit from a continuation of PT for functional mobility and strengthening following his DC  Prognosis: Good  Decision Making: Medium Complexity  PT Education: Goals;PT Role;Plan of Care  REQUIRES PT FOLLOW UP: Yes  Activity Tolerance  Activity Tolerance: Patient limited by fatigue       Patient Diagnosis(es): There were no encounter diagnoses. has a past medical history of Atrial fibrillation ([address] CAD (coronary artery disease), CHF (congestive heart failure) ([address] Depression, Hypertension, Nonthrombocytopenic purpura ([address] and Stroke (cerebrum) ([address] has a past surgical history that includes Cholecystectomy (2019); Gastrostomy tube placement (2019); tracheostomy; tracheostomy (2019); Knee arthroscopy; and Coronary angioplasty with stent (2017).     Restrictions  Restrictions/Precautions  Restrictions/Precautions: Fall Risk  Required Braces or Orthoses?: No  Position Activity Restriction  Other position/activity restrictions: up with assist  Vision/Hearing  Vision: Impaired  Vision Exceptions: Wears glasses for distance  Hearing: Exceptions to WFL  Hearing Exceptions: Hard of hearing/hearing concerns (R ear hearing difficulty)     Subjective  General  Patient assessed for rehabilitation services?: Yes  Response To Previous Treatment: Not applicable  Family / Caregiver Present: No  Follows Commands: Within Functional Limits  Subjective  Subjective: RN and pt agreeable to PT eval  Pain Screening  Patient Currently in Pain: Denies  Vital Signs  Patient Currently in Pain: Denies  Pre Treatment Pain Screening  Pain at present: 0    Orientation  Orientation  Overall Orientation Status: Impaired  Orientation Level: Oriented to person  Social/Functional History  Social/Functional History  Lives With: Other (comment)  Type of Home: Facility  Bathroom Shower/Tub: Walk-in shower (Pt reports getting wheeled into shower)  Bathroom Equipment: Grab bars around toilet  Bathroom Accessibility: Wheelchair accessible  Home Equipment: [address] Help From: Other (comment) (Nursing home staff)  ADL Assistance: Needs assistance  Bath: Dependent/Total  Dressing: Dependent/Total  Grooming: Modified independent  (Requires setup)  Feeding: Modified independent  (Requires setup)  Toileting: Needs assistance  Homemaking Assistance: Needs assistance  Homemaking Responsibilities: No  Ambulation Assistance: Needs assistance  Transfer Assistance: Needs assistance  Active : No  Occupation: Retired  Type of occupation: [address]: Pt enjoys playing games (candy crush) on laptop while lying in bed  Additional Comments: Pt has been residing at [facility] for last 3 years since stroke, pt reports staying in room by choice 90% of time  Cognition      Objective     AROM RLE (degrees)  RLE AROM: WNL  AROM LLE (degrees)  LLE AROM : WFL  AROM RUE (degrees)  RUE AROM : WNL  AROM LUE (degrees)  LUE AROM : WFL  Strength RLE  Strength RLE: WFL  Strength LLE  Comment: 2+/5  Strength RUE  Strength RUE: WFL  Strength LUE  Comment: 2+/5     Sensation  Overall

## 2021-10-11 NOTE — PROGRESS NOTES
[facility]  Office: [address], DO, Jack Adhikariy, DO, Rehan Base, DO, Oli Morley Blood, DO, Shelia Billings MD, Mohinder Prince MD, Luis Kaufman MD, Jannette Brito MD, Terry Mccray MD, Maxim Lopez MD, Elisabeth Howell MD, Fidel Mendoza, DO, Amanda Hickman, DO, Sandy Alvarez MD,  Dain Spears, DO, Lucie Kelly MD, James Waters MD, Mili Lauren MD, Aster Crump MD, Jewell Majano MD, Sung Mcdermott MD, Chauncey Lopez, Gely Lagos, CNP, Keith Fuller, CNP, Evan Lilly, [facility], Ammy Humphries, CNP, Sudhakar Wan, CNP, Baron Lesch, CNP, Jason Hassan, [facility], Saundra Carpenter, CNP, ROSEANNA HornerC, Denver Parks, [facility], John Bernard, CNP, Wilmer Booth, CNP, Fern Marti, CNP, Corbin Parish, CNP, Sung Jameson, CNP, Eri Seymour, CNP, Arlin Lafleur, [address] [place] [address]    Progress Note    10/11/2021    8:27 AM    Name:   Tamanna Waldrop  MRN:     [id number]     Acct:      [de-identified]   Room:   [address] [place] [address] Day:  1  Admit Date:  10/10/2021  9:25 AM    PCP:   No primary care provider on file. Code Status:  Full Code    Subjective:     C/C: Abdominal pain     Interval History Status: improved. Patient was seen and examined at bedside this AM. There were no acute events overnight. It is difficult to assess patient's pain level given his limited ability to communicate but he appears comfortable. MRCP negative for stone/obstruction. Bilirubin and transaminases trending down. GI following.      Brief History:     (per HPI) Tamanna Waldrop is a 61 y.o.  male who presents with   Right upper quadrant abdominal pain     30-year-old male transferred from [facility] for GI evaluation and treatment of suspected choledocho lithiasis  Patient is status post cholecystectomy  He has been having right upper quadrant abdominal pain     Initial work-up in [place] revealed transaminasemia and concerns of Vitals:  /73   Pulse 100   Temp 98.2 °F (36.8 °C)   Resp 18   Ht 6' (1.829 m)   Wt 180 lb 6.4 oz (81.8 kg)   SpO2 99%   BMI 24.47 kg/m²   Temp (24hrs), Av.9 °F (36.6 °C), Min:97.5 °F (36.4 °C), Max:98.2 °F (36.8 °C)    No results for input(s): POCGLU in the last 72 hours. I/O (24Hr): Intake/Output Summary (Last 24 hours) at 10/11/2021 0827  Last data filed at 10/11/2021 0630  Gross per 24 hour   Intake 1496 ml   Output 2650 ml   Net -1154 ml       Labs:  Hematology:  Recent Labs     10/10/21  1023 10/11/21  0706   WBC 1.9* 6.4   RBC 3.69* 4.20*   HGB 11.8* 13.3   HCT 35.3* 40.8   MCV 95.7 97.1   MCH 32.0 31.7   MCHC 33.4 32.6   RDW 14.0 14.6*   PLT See Reflexed IPF Result See Reflexed IPF Result   MPV NOT REPORTED NOT REPORTED   INR 1.1  --      Chemistry:  Recent Labs     10/10/21  1023 10/11/21  0706    140   K 3.7 4.1    107   CO2 24 23   GLUCOSE 89 86   BUN 15 12   CREATININE 0.63* 0.65*   ANIONGAP 12 10   LABGLOM >60 >60   GFRAA >60 >60   CALCIUM 8.6 8.1*     Recent Labs     10/10/21  1023 10/10/21  1251 10/11/21  0706   PROT 6.0*  --  5.2*   LABALBU 3.0*  --  2.7*   AST 62*  --  54*   *  --  90*   ALKPHOS 241*  --  192*   BILITOT 9.09*  --  4.29*   BILIDIR  --   --  3.34*   AMYLASE 35  --   --    LIPASE 21  --   --    PHENYF  --  <0.1*  --      ABG:No results found for: POCPH, PHART, PH, POCPCO2, NNH7MKL, PCO2, POCPO2, PO2ART, PO2, POCHCO3, RTK5NEI, HCO3, NBEA, PBEA, BEART, BE, THGBART, THB, OBA8PSB, EMNO7OVA, N9DQGDUA, O2SAT, FIO2  No results found for: SPECIAL  No results found for: CULTURE    Radiology:  MRI ABDOMEN W WO CONTRAST MRCP    Result Date: 10/10/2021  1. Some technical limitations resulting in blurring of detail and misregistration artifact on a number of the imaging series. 2. Unremarkable appearance of the common bile duct/hepatic duct status post cholecystectomy. No stricture evident.  3. Minimal central hepatic duct dilatation is demonstrated, not unusual following cholecystectomy. Physical Examination:        General appearance:  Frail and chronically ill-appearing. Mental Status:  Unable to assess as patient is not speaking at this time (will nod his head yes or no to questioning)   Lungs:  clear to auscultation bilaterally, normal effort  Heart:  Irregular rhythm   Abdomen: RUQ tenderness appreciated.    Extremities:  no edema, redness, tenderness in the calves  Skin:  no gross lesions, rashes, induration    Assessment:        Hospital Problems         Last Modified POA    * (Principal) Choledocholithiasis 10/10/2021 Yes    Abdominal pain 10/9/2021 Yes    Hypotension due to hypovolemia 10/10/2021 Yes    History of completed stroke with residual left hemiparesis 10/10/2021 Yes    Left hemiparesis ([address] 10/10/2021 Yes    Essential hypertension 10/10/2021 Yes    Atrial fibrillation ([address] 10/10/2021 Yes    Coronary artery disease involving native coronary artery of native heart without angina pectoris 10/10/2021 Yes    Stroke (cerebrum) ([address] 10/10/2021 Yes    Nonthrombocytopenic purpura ([address] 10/10/2021 Yes    Obstructive jaundice 10/10/2021 Yes    Leukopenia 10/10/2021 Yes    Anemia, normocytic normochromic 10/10/2021 Yes          Plan:        Abdominal pain, improving   -MRCP negative for obstruction   -GI following; appreciate recommendations  -Bilirubin and transaminases trending down   -Continue maintenance fluids at 125 mL/hr   -Diet NPO   -Continue Zosyn   -Daily CBC   -Daily BMP     Hyperbilirubinemia, improving   -Management as above     Atrial fibrillation   -Currently rate-controlled   -Holding home diltiazem 2/2 hypotension     Hx of stroke   -Stable; continue to monitor   -Continue home atorvastatin 40 mg nightly     HTN  -Holding home lisinopril 2/2 hypotension   -Holding home bisoprolol     Lieutenant Jeanmarie MD  10/11/2021  8:27 AM

## 2021-10-12 PROBLEM — Z86.79 HISTORY OF CHF (CONGESTIVE HEART FAILURE): Status: ACTIVE | Noted: 2021-10-12

## 2021-10-12 LAB
ABSOLUTE EOS #: 0.32 K/UL (ref 0–0.44)
ABSOLUTE IMMATURE GRANULOCYTE: <0.03 K/UL (ref 0–0.3)
ABSOLUTE LYMPH #: 0.92 K/UL (ref 1.1–3.7)
ABSOLUTE MONO #: 0.59 K/UL (ref 0.1–1.2)
ALBUMIN SERPL-MCNC: 2.6 G/DL (ref 3.5–5.2)
ALBUMIN SERPL-MCNC: 2.9 G/DL (ref 3.5–5.2)
ALBUMIN/GLOBULIN RATIO: 1 (ref 1–2.5)
ALBUMIN/GLOBULIN RATIO: 1 (ref 1–2.5)
ALP BLD-CCNC: 205 U/L (ref 40–129)
ALP BLD-CCNC: 228 U/L (ref 40–129)
ALT SERPL-CCNC: 74 U/L (ref 5–41)
ALT SERPL-CCNC: 82 U/L (ref 5–41)
ANION GAP SERPL CALCULATED.3IONS-SCNC: 11 MMOL/L (ref 9–17)
ANION GAP SERPL CALCULATED.3IONS-SCNC: 16 MMOL/L (ref 9–17)
AST SERPL-CCNC: 43 U/L
AST SERPL-CCNC: 47 U/L
BASOPHILS # BLD: 1 % (ref 0–2)
BASOPHILS ABSOLUTE: 0.03 K/UL (ref 0–0.2)
BILIRUB SERPL-MCNC: 3 MG/DL (ref 0.3–1.2)
BILIRUB SERPL-MCNC: 3.02 MG/DL (ref 0.3–1.2)
BUN BLDV-MCNC: 8 MG/DL (ref 8–23)
BUN BLDV-MCNC: 9 MG/DL (ref 8–23)
BUN/CREAT BLD: ABNORMAL (ref 9–20)
BUN/CREAT BLD: ABNORMAL (ref 9–20)
CALCIUM SERPL-MCNC: 8.2 MG/DL (ref 8.6–10.4)
CALCIUM SERPL-MCNC: 8.3 MG/DL (ref 8.6–10.4)
CHLORIDE BLD-SCNC: 102 MMOL/L (ref 98–107)
CHLORIDE BLD-SCNC: 107 MMOL/L (ref 98–107)
CO2: 19 MMOL/L (ref 20–31)
CO2: 21 MMOL/L (ref 20–31)
CREAT SERPL-MCNC: 0.61 MG/DL (ref 0.7–1.2)
CREAT SERPL-MCNC: 0.64 MG/DL (ref 0.7–1.2)
DIFFERENTIAL TYPE: ABNORMAL
EOSINOPHILS RELATIVE PERCENT: 5 % (ref 1–4)
GFR AFRICAN AMERICAN: >60 ML/MIN
GFR AFRICAN AMERICAN: >60 ML/MIN
GFR NON-AFRICAN AMERICAN: >60 ML/MIN
GFR NON-AFRICAN AMERICAN: >60 ML/MIN
GFR SERPL CREATININE-BSD FRML MDRD: ABNORMAL ML/MIN/{1.73_M2}
GLUCOSE BLD-MCNC: 73 MG/DL (ref 70–99)
GLUCOSE BLD-MCNC: 80 MG/DL (ref 70–99)
HCT VFR BLD CALC: 42.5 % (ref 40.7–50.3)
HEMOGLOBIN: 13.2 G/DL (ref 13–17)
IMMATURE GRANULOCYTES: 0 %
LYMPHOCYTES # BLD: 15 % (ref 24–43)
MAGNESIUM: 1.9 MG/DL (ref 1.6–2.6)
MAGNESIUM: 2.3 MG/DL (ref 1.6–2.6)
MCH RBC QN AUTO: 31.5 PG (ref 25.2–33.5)
MCHC RBC AUTO-ENTMCNC: 31.1 G/DL (ref 28.4–34.8)
MCV RBC AUTO: 101.4 FL (ref 82.6–102.9)
MONOCYTES # BLD: 9 % (ref 3–12)
NRBC AUTOMATED: 0 PER 100 WBC
PDW BLD-RTO: 14.4 % (ref 11.8–14.4)
PLATELET # BLD: 163 K/UL (ref 138–453)
PLATELET ESTIMATE: ABNORMAL
PMV BLD AUTO: 11.5 FL (ref 8.1–13.5)
POTASSIUM SERPL-SCNC: 3.3 MMOL/L (ref 3.7–5.3)
POTASSIUM SERPL-SCNC: 3.7 MMOL/L (ref 3.7–5.3)
RBC # BLD: 4.19 M/UL (ref 4.21–5.77)
RBC # BLD: ABNORMAL 10*6/UL
SEG NEUTROPHILS: 70 % (ref 36–65)
SEGMENTED NEUTROPHILS ABSOLUTE COUNT: 4.46 K/UL (ref 1.5–8.1)
SODIUM BLD-SCNC: 137 MMOL/L (ref 135–144)
SODIUM BLD-SCNC: 139 MMOL/L (ref 135–144)
TOTAL PROTEIN: 5.3 G/DL (ref 6.4–8.3)
TOTAL PROTEIN: 5.9 G/DL (ref 6.4–8.3)
WBC # BLD: 6.3 K/UL (ref 3.5–11.3)
WBC # BLD: ABNORMAL 10*3/UL

## 2021-10-12 PROCEDURE — 6370000000 HC RX 637 (ALT 250 FOR IP): Performed by: INTERNAL MEDICINE

## 2021-10-12 PROCEDURE — 36415 COLL VENOUS BLD VENIPUNCTURE: CPT

## 2021-10-12 PROCEDURE — 6360000002 HC RX W HCPCS: Performed by: INTERNAL MEDICINE

## 2021-10-12 PROCEDURE — 6360000002 HC RX W HCPCS: Performed by: NURSE PRACTITIONER

## 2021-10-12 PROCEDURE — 83735 ASSAY OF MAGNESIUM: CPT

## 2021-10-12 PROCEDURE — 2580000003 HC RX 258: Performed by: NURSE PRACTITIONER

## 2021-10-12 PROCEDURE — 97530 THERAPEUTIC ACTIVITIES: CPT

## 2021-10-12 PROCEDURE — 1200000000 HC SEMI PRIVATE

## 2021-10-12 PROCEDURE — 2500000003 HC RX 250 WO HCPCS: Performed by: NURSE PRACTITIONER

## 2021-10-12 PROCEDURE — 99232 SBSQ HOSP IP/OBS MODERATE 35: CPT | Performed by: INTERNAL MEDICINE

## 2021-10-12 PROCEDURE — 93005 ELECTROCARDIOGRAM TRACING: CPT | Performed by: INTERNAL MEDICINE

## 2021-10-12 PROCEDURE — 80053 COMPREHEN METABOLIC PANEL: CPT

## 2021-10-12 PROCEDURE — 85025 COMPLETE CBC W/AUTO DIFF WBC: CPT

## 2021-10-12 PROCEDURE — 6370000000 HC RX 637 (ALT 250 FOR IP): Performed by: NURSE PRACTITIONER

## 2021-10-12 RX ORDER — METOPROLOL TARTRATE 5 MG/5ML
5 INJECTION INTRAVENOUS ONCE
Status: COMPLETED | OUTPATIENT
Start: 2021-10-12 | End: 2021-10-12

## 2021-10-12 RX ORDER — DILTIAZEM HYDROCHLORIDE 180 MG/1
180 CAPSULE, COATED, EXTENDED RELEASE ORAL DAILY
Status: DISCONTINUED | OUTPATIENT
Start: 2021-10-12 | End: 2021-10-17 | Stop reason: HOSPADM

## 2021-10-12 RX ORDER — LISINOPRIL 2.5 MG/1
2.5 TABLET ORAL DAILY
Status: DISCONTINUED | OUTPATIENT
Start: 2021-10-12 | End: 2021-10-15

## 2021-10-12 RX ORDER — METOPROLOL TARTRATE 50 MG/1
50 TABLET, FILM COATED ORAL 2 TIMES DAILY
Status: DISCONTINUED | OUTPATIENT
Start: 2021-10-12 | End: 2021-10-15

## 2021-10-12 RX ORDER — FUROSEMIDE 10 MG/ML
40 INJECTION INTRAMUSCULAR; INTRAVENOUS ONCE
Status: COMPLETED | OUTPATIENT
Start: 2021-10-12 | End: 2021-10-12

## 2021-10-12 RX ORDER — FUROSEMIDE 40 MG/1
40 TABLET ORAL DAILY
Status: DISCONTINUED | OUTPATIENT
Start: 2021-10-12 | End: 2021-10-17 | Stop reason: HOSPADM

## 2021-10-12 RX ORDER — AMOXICILLIN AND CLAVULANATE POTASSIUM 875; 125 MG/1; MG/1
1 TABLET, FILM COATED ORAL EVERY 12 HOURS SCHEDULED
Status: DISCONTINUED | OUTPATIENT
Start: 2021-10-12 | End: 2021-10-17 | Stop reason: HOSPADM

## 2021-10-12 RX ORDER — ASPIRIN 81 MG/1
81 TABLET ORAL DAILY
Status: DISCONTINUED | OUTPATIENT
Start: 2021-10-12 | End: 2021-10-17 | Stop reason: HOSPADM

## 2021-10-12 RX ORDER — MAGNESIUM SULFATE 1 G/100ML
1000 INJECTION INTRAVENOUS
Status: COMPLETED | OUTPATIENT
Start: 2021-10-12 | End: 2021-10-12

## 2021-10-12 RX ADMIN — MAGNESIUM SULFATE HEPTAHYDRATE 1000 MG: 1 INJECTION, SOLUTION INTRAVENOUS at 13:44

## 2021-10-12 RX ADMIN — Medication 81 MG: at 10:58

## 2021-10-12 RX ADMIN — DOCUSATE SODIUM 100 MG: 100 CAPSULE ORAL at 09:21

## 2021-10-12 RX ADMIN — MAGNESIUM SULFATE HEPTAHYDRATE 1000 MG: 1 INJECTION, SOLUTION INTRAVENOUS at 11:51

## 2021-10-12 RX ADMIN — ENOXAPARIN SODIUM 40 MG: 40 INJECTION SUBCUTANEOUS at 09:22

## 2021-10-12 RX ADMIN — DILTIAZEM HYDROCHLORIDE 180 MG: 180 CAPSULE, COATED, EXTENDED RELEASE ORAL at 09:21

## 2021-10-12 RX ADMIN — METOPROLOL TARTRATE 50 MG: 50 TABLET, FILM COATED ORAL at 10:58

## 2021-10-12 RX ADMIN — AMOXICILLIN AND CLAVULANATE POTASSIUM 1 TABLET: 875; 125 TABLET, FILM COATED ORAL at 13:44

## 2021-10-12 RX ADMIN — PIPERACILLIN AND TAZOBACTAM 3375 MG: 3; .375 INJECTION, POWDER, FOR SOLUTION INTRAVENOUS at 04:32

## 2021-10-12 RX ADMIN — METOPROLOL TARTRATE 5 MG: 1 INJECTION, SOLUTION INTRAVENOUS at 05:12

## 2021-10-12 RX ADMIN — POTASSIUM CHLORIDE 40 MEQ: 1500 TABLET, EXTENDED RELEASE ORAL at 22:50

## 2021-10-12 RX ADMIN — EXTENDED PHENYTOIN SODIUM 30 MG: 30 CAPSULE ORAL at 11:53

## 2021-10-12 RX ADMIN — METOPROLOL TARTRATE 50 MG: 50 TABLET, FILM COATED ORAL at 20:49

## 2021-10-12 RX ADMIN — LISINOPRIL 2.5 MG: 2.5 TABLET ORAL at 10:58

## 2021-10-12 RX ADMIN — FUROSEMIDE 40 MG: 10 INJECTION, SOLUTION INTRAMUSCULAR; INTRAVENOUS at 13:44

## 2021-10-12 ASSESSMENT — PAIN SCALES - GENERAL
PAINLEVEL_OUTOF10: 0

## 2021-10-12 NOTE — PROGRESS NOTES
1200- Pt had episode of A. Fib RVR and increase work in breathing. Dr. Mode Atkinson notified, new orders received and carried out. 1617- Pt resting in bed comfortably at this time, vital signs stabilized. HR controlled with home medications. Pt having good urine output after Lasix dose. External catheter placed. Blood pressure maintained. Pt has had poor appetite and appears withdrawn, minimal verbal communication noted. Call light within reach. Daughter updated to plan. Monitor hemodynamics over night, re-evaluate for discharge tomorrow.

## 2021-10-12 NOTE — PROGRESS NOTES
Occupational [address] [employer]  Occupational Therapy Not Seen Note    DATE: 10/12/2021    NAME: Brenda Nur  MRN: [id number]   : 1955      Patient not seen this date for Occupational Therapy due to: Other: Per RN, pt /114. Not appropriate for therapy this day.         Electronically signed by KEITH Monroe on 10/12/2021 at 11:43 AM

## 2021-10-12 NOTE — PROGRESS NOTES
[facility]  Office: [address], DO, Cesilia Andrews, DO, Xuan Melton, DO, Eduardo Reilly Blood, DO, Pawan Chandler MD, Lynette Han MD, Lucille Madrigal MD, Lito Campos MD, Kurtis Pina MD, Elizabeth Martinez MD, Poonam Morillo MD, Rafael Hough MD, Yany Samaniego, DO, Kirk Baeza DO, Karolina Banks MD,  Darien Mckeon DO, Jazmin Anthony MD, Stacy Mayer MD, Ena Keller MD, Sherri Sim MD, Suzy Garcia MD, Ronit Gibson MD, Bertrand De, [facility], [facility], CNP, Alondra Fischer, CNP, Rochelle Reddy, CNS, Yvonne Chu, CNP, Dorcas Reddy, CNP, Justina Burgos, CNP, Cherelle Medina, [facility], Neymar Cao, [facility], Francisco Loera, CNP, Rowan Oppenheim, PA-C, Didi Peace, [facility], Josemanuel Barron, CNP, Tio Calles, CNP, Della Mandel, CNP, Kirsten Garber, CNP, Saulo Ann, CNP, Az Dickson, CNP, Manjit Cobb, [address] [place] [address]    Progress Note    10/12/2021    2:39 PM    Name:   Marleny Ward  MRN:     [id number]     [place]:      [de-identified]   Room:   [address] [place] [address] Day:  2  Admit Date:  10/10/2021  9:25 AM    PCP:   No primary care provider on file.   Code Status:  Full Code    Subjective:     C/C: Abdominal pain    Interval History Status:   Patient denies any abdominal pain today  MRCP negative for stone/obstruction, or GI probably he has passed the stone  Bilirubin and transaminases are trending down  GI has signed off  Patient appears to be in A. fib with RVR, got one-time dose of IV Lopressor earlier morning and is oral home medicines were on hold    Brief History:     (per HPI) Steve Nayak a 61 y.o.  male who presents with   Right upper quadrant abdominal pain     59-year-old male transferred from [facility] and treatment of suspected choledocho lithiasis  Patient is status post cholecystectomy  He has been having right upper quadrant abdominal pain     Initial work-up in [place] revealed transaminasemia and concerns of choledocholithiasis  He apparently did require pressors for blood pressure support last PM  Results no available for review at this time     The daughter reports that the patient has been residing in a nursing facility for the last 3 years following a stroke    Review of Systems:     Constitutional:  negative for chills, fevers, sweats  Respiratory:  negative for cough, dyspnea on exertion, shortness of breath, wheezing  Cardiovascular:  negative for chest pain, chest pressure/discomfort, lower extremity edema, palpitations  Gastrointestinal: + Mild abdominal pain, denies constipation, diarrhea, nausea, vomiting  Neurological:  negative for dizziness, headache    Medications: Allergies:  No Known Allergies    Current Meds:   Scheduled Meds:    dilTIAZem  180 mg Oral Daily    aspirin EC  81 mg Oral Daily    lisinopril  2.5 mg Oral Daily    metoprolol tartrate  50 mg Oral BID    magnesium sulfate  1,000 mg IntraVENous Q1H    amoxicillin-clavulanate  1 tablet Oral 2 times per day    sodium chloride flush  5-40 mL IntraVENous 2 times per day    enoxaparin  40 mg SubCUTAneous Daily    phenytoin  30 mg Oral Daily    docusate sodium  100 mg Oral Daily    sodium chloride flush  5-40 mL IntraVENous BID    sodium chloride  500 mL IntraVENous Once     Continuous Infusions:    sodium chloride       PRN Meds: sodium chloride flush, sodium chloride, potassium chloride **OR** potassium alternative oral replacement **OR** potassium chloride, magnesium sulfate, ondansetron **OR** ondansetron, polyethylene glycol, acetaminophen **OR** acetaminophen    Data:     Past Medical History:   has a past medical history of Atrial fibrillation ([facility] [address] CAD (coronary artery disease), CHF (congestive heart failure) ([facility] [facility] Depression, Hypertension, Nonthrombocytopenic purpura ([facility] [address] and St[facility] Social History:   reports that he has quit smoking.  He has never used smokeless tobacco. He reports that he does not drink alcohol and does not use drugs. Family History:   Family History   Problem Relation Age of Onset    No Known Problems Mother     Heart Disease Father        Vitals:  BP (!) 123/99   Pulse 93   Temp 98.8 °F (37.1 °C) (Oral)   Resp 23   Ht 6' (1.829 m)   Wt 180 lb 6.4 oz (81.8 kg)   SpO2 95%   BMI 24.47 kg/m²   Temp (24hrs), Av.5 °F (36.9 °C), Min:98.3 °F (36.8 °C), Max:98.8 °F (37.1 °C)    No results for input(s): POCGLU in the last 72 hours. I/O (24Hr):     Intake/Output Summary (Last 24 hours) at 10/12/2021 1439  Last data filed at 10/11/2021 1806  Gross per 24 hour   Intake 2120 ml   Output 800 ml   Net 1320 ml       Labs:  Hematology:  Recent Labs     10/10/21  1023 10/11/21  0706 10/12/21  0423   WBC 1.9* 6.4 6.3   RBC 3.69* 4.20* 4.19*   HGB 11.8* 13.3 13.2   HCT 35.3* 40.8 42.5   MCV 95.7 97.1 101.4   MCH 32.0 31.7 31.5   MCHC 33.4 32.6 31.1   RDW 14.0 14.6* 14.4   PLT See Reflexed IPF Result See Reflexed IPF Result 163   MPV NOT REPORTED NOT REPORTED 11.5   INR 1.1  --   --      Chemistry:  Recent Labs     10/10/21  1023 10/11/21  0706 10/12/21  0423    140 137   K 3.7 4.1 3.7    107 107   CO2 24 23 19*   GLUCOSE 89 86 80   BUN 15 12 9   CREATININE 0.63* 0.65* 0.61*   MG  --   --  1.9   ANIONGAP 12 10 11   LABGLOM >60 >60 >60   GFRAA >60 >60 >60   CALCIUM 8.6 8.1* 8.2*     Recent Labs     10/10/21  1023 10/10/21  1251 10/11/21  0706 10/12/21  0423   PROT 6.0*  --  5.2* 5.3*   LABALBU 3.0*  --  2.7* 2.6*   AST 62*  --  54* 43*   *  --  90* 74*   ALKPHOS 241*  --  192* 205*   BILITOT 9.09*  --  4.29* 3.00*   BILIDIR  --   --  3.34*  --    AMYLASE 35  --   --   --    LIPASE 21  --   --   --    PHENYF  --  <0.1*  --   --      ABG:No results found for: POCPH, PHART, PH, POCPCO2, BWL6AGA, PCO2, POCPO2, PO2ART, PO2, POCHCO3, KRX7EJM, HCO3, NBEA, PBEA, BEART, BE, THGBART, THB, XCW2FBM, ARKD2CSP, F4SBWIRB, O2SAT, FIO2  Lab Results Component Value Date/Time    SPECIAL NOT REPORTED 10/10/2021 05:35 PM     Lab Results   Component Value Date/Time    CULTURE NO GROWTH 10/10/2021 05:35 PM       Radiology:  MRI ABDOMEN W WO CONTRAST MRCP    Result Date: 10/10/2021  1. Some technical limitations resulting in blurring of detail and misregistration artifact on a number of the imaging series. 2. Unremarkable appearance of the common bile duct/hepatic duct status post cholecystectomy. No stricture evident. 3. Minimal central hepatic duct dilatation is demonstrated, not unusual following cholecystectomy. Physical Examination:        General appearance: Ill-looking, slightly short of breath, not able to communicate properly due to previous stroke  Mental Status: Answers appropriately  Lungs:  + Bibasilar Rales  Heart: Irregular rhythm, tachycardic  Abdomen: + Mild upper abdominal discomfort  Extremities:  no edema, redness, tenderness in the calves  Skin:  no gross lesions, rashes, induration    Assessment:        Hospital Problems         Last Modified POA    * (Principal) Choledocholithiasis 10/10/2021 Yes    Abdominal pain 10/9/2021 Yes    Hypotension due to hypovolemia 10/10/2021 Yes    History of completed stroke with residual left hemiparesis 10/10/2021 Yes    Left hemiparesis ([address] 10/10/2021 Yes    Essential hypertension 10/10/2021 Yes    Atrial fibrillation ([address] 10/10/2021 Yes    Coronary artery disease involving native coronary artery of native heart without angina pectoris 10/10/2021 Yes    Stroke (cerebrum) ([address] 10/10/2021 Yes    Nonthrombocytopenic purpura ([address] 10/10/2021 Yes    Obstructive jaundice 10/10/2021 Yes    Leukopenia 10/10/2021 Yes    Anemia, normocytic normochromic 10/10/2021 Yes    Elevated LFTs 10/11/2021 Yes    History of CHF (congestive heart failure) 10/12/2021 Yes          Plan:        1. Discontinue IV fluids due to history of CHF to avoid fluid overload and encouraged to take orally  2.  DC IV Zosyn and switch to

## 2021-10-12 NOTE — PLAN OF CARE
Problem: Infection:  Goal: Will remain free from infection  Description: Will remain free from infection  10/12/2021 1031 by Rebecca Reddy RN  Outcome: Ongoing  10/12/2021 0436 by Llilie Hoffmann RN  Outcome: Ongoing     Problem: Safety:  Goal: Free from accidental physical injury  Description: Free from accidental physical injury  10/12/2021 1031 by Rebecca Reddy RN  Outcome: Ongoing  10/12/2021 0436 by Lillie Hoffmann RN  Outcome: Ongoing  Goal: Free from intentional harm  Description: Free from intentional harm  10/12/2021 1031 by Rebecca Reddy RN  Outcome: Ongoing  10/12/2021 0436 by Lillie Hoffmann RN  Outcome: Ongoing     Problem: Daily Care:  Goal: Daily care needs are met  Description: Daily care needs are met  10/12/2021 1031 by Rebecca Reddy RN  Outcome: Ongoing  10/12/2021 0436 by Lillie Hoffmann RN  Outcome: Ongoing     Problem: Pain:  Goal: Patient's pain/discomfort is manageable  Description: Patient's pain/discomfort is manageable  10/12/2021 1031 by Rebecca Reddy RN  Outcome: Ongoing  10/12/2021 0436 by Lillie Hoffmann RN  Outcome: Ongoing     Problem: Skin Integrity:  Goal: Skin integrity will stabilize  Description: Skin integrity will stabilize  10/12/2021 1031 by Rebecca Reddy RN  Outcome: Ongoing  10/12/2021 0436 by Lillie Hoffmann RN  Outcome: Ongoing     Problem: Discharge Planning:  Goal: Patients continuum of care needs are met  Description: Patients continuum of care needs are met  10/12/2021 1031 by Rebecca Reddy RN  Outcome: Ongoing  10/12/2021 0436 by Lillie Hoffmann RN  Outcome: Ongoing     Problem: Falls - Risk of:  Goal: Will remain free from falls  Description: Will remain free from falls  10/12/2021 1031 by Rebecca Reddy RN  Outcome: Ongoing  10/12/2021 0436 by Lillie Hoffmann RN  Outcome: Ongoing  Goal: Absence of physical injury  Description: Absence of physical injury  10/12/2021 1031 by Rebecca Reddy RN  Outcome: Ongoing  10/12/2021 0436 by Lillie Hoffmann RN  Outcome: Ongoing     Problem: Skin Integrity:  Goal: Will show no infection signs and symptoms  Description: Will show no infection signs and symptoms  10/12/2021 1031 by Janie Bhatia RN  Outcome: Ongoing  10/12/2021 0436 by Priscila Flores RN  Outcome: Ongoing  Goal: Absence of new skin breakdown  Description: Absence of new skin breakdown  10/12/2021 1031 by Janie Bhatia RN  Outcome: Ongoing  10/12/2021 0436 by Priscila Flores RN  Outcome: Ongoing     Problem: Musculor/Skeletal Functional Status  Goal: Highest potential functional level  10/12/2021 1031 by Janie Bhatia RN  Outcome: Ongoing  10/12/2021 0436 by Priscila Flores RN  Outcome: Ongoing

## 2021-10-12 NOTE — PROGRESS NOTES
Physical Therapy  Facility/Department: [address] ORTHO/MED SURG  Daily Treatment Note  NAME: Marco Antonio Haddad  : 1955  MRN: [id number]    Date of Service: 10/12/2021    Discharge Recommendations:  Patient would benefit from continued therapy after discharge   PT Equipment Recommendations  Equipment Needed: No    Assessment   Body structures, Functions, Activity limitations: Decreased functional mobility ; Decreased strength;Decreased endurance  Assessment: Pt requiring MAX A for all bed mobility and maintain seated EOB x 5 min. Recommend continued PT after d/c to address deficits  Prognosis: Good  REQUIRES PT FOLLOW UP: Yes  Activity Tolerance  Activity Tolerance: Patient limited by fatigue     Patient Diagnosis(es): There were no encounter diagnoses. has a past medical history of Atrial fibrillation ([facility] CAD (coronary artery disease), CHF (congestive heart failure) ([address] Depression, Hypertension, Nonthrombocytopenic purpura ([facility] and Stroke (cerebrum) ([facility] has a past surgical history that includes Cholecystectomy (2019); Gastrostomy tube placement (2019); tracheostomy; tracheostomy (2019); Knee arthroscopy; and Coronary angioplasty with stent (2017). Restrictions  Restrictions/Precautions  Restrictions/Precautions: Fall Risk  Required Braces or Orthoses?: No  Position Activity Restriction  Other position/activity restrictions: up with assist  Subjective   General  Response To Previous Treatment: Patient with no complaints from previous session.   Family / Caregiver Present: No  Subjective  Subjective: Pt resting in bed upon arrival agreeable to PT with encouragment, very flat, difficult to understand at times  Pain Screening  Patient Currently in Pain: Denies  Vital Signs  Patient Currently in Pain: Denies       Orientation  Orientation  Overall Orientation Status: Impaired  Orientation Level: Disoriented to situation  Cognition      Objective   Bed mobility  Rolling to Left: Maximum assistance  Rolling to Right: Maximum assistance  Supine to Sit: Maximum assistance  Sit to Supine: Maximum assistance  Transfers  Sit to Stand: Unable to assess  Ambulation  Ambulation?: No     Balance  Posture: Fair  Sitting - Static: Poor  Sitting - Dynamic: Poor  Comments: Pt sat EOB x 5 min, requiring MAX A throughout due to heavy post lean   Exercise   L UE and L LE AAROM/PROM in all planes x 10    AM-PAC Score  AM-PAC Inpatient Mobility Raw Score : 8 (10/12/21 1213)  AM-PAC Inpatient T-Scale Score : 28.52 (10/12/21 1213)  Mobility Inpatient CMS 0-100% Score: 86.62 (10/12/21 1213)  Mobility Inpatient CMS G-Code Modifier : CM (10/12/21 1213)          Goals  Short term goals  Time Frame for Short term goals: 10 visits  Short term goal 1: transfers with min assist  Short term goal 2: bed to chair with min assist  Short term goal 3: 20 min exercise program with min assist  Short term goal 4: min assist with bed mobility    Plan    Plan  Times per week: 5-6x wk  Current Treatment Recommendations: Strengthening, Functional Mobility Training, Safety Education & Training, Endurance Training, Transfer Training, Balance Training, Positioning  Safety Devices  Type of devices: Nurse notified, Left in bed, Call light within reach, Bed alarm in place  Restraints  Initially in place: No     Therapy Time   Individual Concurrent Group Co-treatment   Time In 1030         Time Out 1100         Minutes 30         Timed Code Treatment Minutes: [address], \[facility] [place] [facility]\""

## 2021-10-12 NOTE — PLAN OF CARE
Problem: Infection:  Goal: Will remain free from infection  Description: Will remain free from infection  Outcome: Ongoing     Problem: Safety:  Goal: Free from accidental physical injury  Description: Free from accidental physical injury  Outcome: Ongoing  Goal: Free from intentional harm  Description: Free from intentional harm  Outcome: Ongoing     Problem: Daily Care:  Goal: Daily care needs are met  Description: Daily care needs are met  Outcome: Ongoing     Problem: Pain:  Goal: Patient's pain/discomfort is manageable  Description: Patient's pain/discomfort is manageable  Outcome: Ongoing     Problem: Skin Integrity:  Goal: Skin integrity will stabilize  Description: Skin integrity will stabilize  Outcome: Ongoing     Problem: Discharge Planning:  Goal: Patients continuum of care needs are met  Description: Patients continuum of care needs are met  Outcome: Ongoing     Problem: Falls - Risk of:  Goal: Will remain free from falls  Description: Will remain free from falls  Outcome: Ongoing  Goal: Absence of physical injury  Description: Absence of physical injury  Outcome: Ongoing     Problem: Skin Integrity:  Goal: Will show no infection signs and symptoms  Description: Will show no infection signs and symptoms  Outcome: Ongoing  Goal: Absence of new skin breakdown  Description: Absence of new skin breakdown  Outcome: Ongoing     Problem: Musculor/Skeletal Functional Status  Goal: Highest potential functional level  Outcome: Ongoing

## 2021-10-13 ENCOUNTER — APPOINTMENT (OUTPATIENT)
Dept: GENERAL RADIOLOGY | Age: 66
DRG: 251 | End: 2021-10-13
Attending: INTERNAL MEDICINE
Payer: MEDICAID

## 2021-10-13 LAB
ABSOLUTE EOS #: 0.5 K/UL (ref 0–0.44)
ABSOLUTE IMMATURE GRANULOCYTE: 0.04 K/UL (ref 0–0.3)
ABSOLUTE LYMPH #: 1.02 K/UL (ref 1.1–3.7)
ABSOLUTE MONO #: 0.76 K/UL (ref 0.1–1.2)
ALBUMIN SERPL-MCNC: 2.8 G/DL (ref 3.5–5.2)
ALBUMIN SERPL-MCNC: 3.1 G/DL (ref 3.5–5.2)
ALBUMIN/GLOBULIN RATIO: 0.9 (ref 1–2.5)
ALBUMIN/GLOBULIN RATIO: 1.1 (ref 1–2.5)
ALP BLD-CCNC: 191 U/L (ref 40–129)
ALP BLD-CCNC: 202 U/L (ref 40–129)
ALT SERPL-CCNC: 81 U/L (ref 5–41)
ALT SERPL-CCNC: 89 U/L (ref 5–41)
ANION GAP SERPL CALCULATED.3IONS-SCNC: 10 MMOL/L (ref 9–17)
ANION GAP SERPL CALCULATED.3IONS-SCNC: 16 MMOL/L (ref 9–17)
AST SERPL-CCNC: 49 U/L
AST SERPL-CCNC: 56 U/L
BASOPHILS # BLD: 1 % (ref 0–2)
BASOPHILS ABSOLUTE: 0.05 K/UL (ref 0–0.2)
BILIRUB SERPL-MCNC: 2.2 MG/DL (ref 0.3–1.2)
BILIRUB SERPL-MCNC: 2.61 MG/DL (ref 0.3–1.2)
BNP INTERPRETATION: ABNORMAL
BUN BLDV-MCNC: 10 MG/DL (ref 8–23)
BUN BLDV-MCNC: 10 MG/DL (ref 8–23)
BUN/CREAT BLD: ABNORMAL (ref 9–20)
BUN/CREAT BLD: ABNORMAL (ref 9–20)
CALCIUM SERPL-MCNC: 8.4 MG/DL (ref 8.6–10.4)
CALCIUM SERPL-MCNC: 8.5 MG/DL (ref 8.6–10.4)
CHLORIDE BLD-SCNC: 103 MMOL/L (ref 98–107)
CHLORIDE BLD-SCNC: 104 MMOL/L (ref 98–107)
CO2: 18 MMOL/L (ref 20–31)
CO2: 25 MMOL/L (ref 20–31)
CREAT SERPL-MCNC: 0.54 MG/DL (ref 0.7–1.2)
CREAT SERPL-MCNC: 0.69 MG/DL (ref 0.7–1.2)
DIFFERENTIAL TYPE: ABNORMAL
EKG ATRIAL RATE: 178 BPM
EKG Q-T INTERVAL: 318 MS
EKG QRS DURATION: 82 MS
EKG QTC CALCULATION (BAZETT): 505 MS
EKG R AXIS: -46 DEGREES
EKG T AXIS: 160 DEGREES
EKG VENTRICULAR RATE: 152 BPM
EOSINOPHILS RELATIVE PERCENT: 6 % (ref 1–4)
GFR AFRICAN AMERICAN: >60 ML/MIN
GFR AFRICAN AMERICAN: >60 ML/MIN
GFR NON-AFRICAN AMERICAN: >60 ML/MIN
GFR NON-AFRICAN AMERICAN: >60 ML/MIN
GFR SERPL CREATININE-BSD FRML MDRD: ABNORMAL ML/MIN/{1.73_M2}
GLUCOSE BLD-MCNC: 122 MG/DL (ref 70–99)
GLUCOSE BLD-MCNC: 63 MG/DL (ref 70–99)
HCT VFR BLD CALC: 45.9 % (ref 40.7–50.3)
HEMOGLOBIN: 14.2 G/DL (ref 13–17)
IMMATURE GRANULOCYTES: 1 %
LYMPHOCYTES # BLD: 12 % (ref 24–43)
MCH RBC QN AUTO: 31.7 PG (ref 25.2–33.5)
MCHC RBC AUTO-ENTMCNC: 30.9 G/DL (ref 28.4–34.8)
MCV RBC AUTO: 102.5 FL (ref 82.6–102.9)
MONOCYTES # BLD: 9 % (ref 3–12)
NRBC AUTOMATED: 0 PER 100 WBC
PDW BLD-RTO: 14.2 % (ref 11.8–14.4)
PLATELET # BLD: 217 K/UL (ref 138–453)
PLATELET ESTIMATE: ABNORMAL
PMV BLD AUTO: 11.2 FL (ref 8.1–13.5)
POTASSIUM SERPL-SCNC: 3.8 MMOL/L (ref 3.7–5.3)
POTASSIUM SERPL-SCNC: 4.7 MMOL/L (ref 3.7–5.3)
PRO-BNP: 4301 PG/ML
RBC # BLD: 4.48 M/UL (ref 4.21–5.77)
RBC # BLD: ABNORMAL 10*6/UL
SEG NEUTROPHILS: 71 % (ref 36–65)
SEGMENTED NEUTROPHILS ABSOLUTE COUNT: 6.04 K/UL (ref 1.5–8.1)
SODIUM BLD-SCNC: 137 MMOL/L (ref 135–144)
SODIUM BLD-SCNC: 139 MMOL/L (ref 135–144)
TOTAL PROTEIN: 5.8 G/DL (ref 6.4–8.3)
TOTAL PROTEIN: 6 G/DL (ref 6.4–8.3)
TROPONIN INTERP: ABNORMAL
TROPONIN T: ABNORMAL NG/ML
TROPONIN, HIGH SENSITIVITY: 28 NG/L (ref 0–22)
WBC # BLD: 8.4 K/UL (ref 3.5–11.3)
WBC # BLD: ABNORMAL 10*3/UL

## 2021-10-13 PROCEDURE — 83880 ASSAY OF NATRIURETIC PEPTIDE: CPT

## 2021-10-13 PROCEDURE — 71045 X-RAY EXAM CHEST 1 VIEW: CPT

## 2021-10-13 PROCEDURE — 6360000002 HC RX W HCPCS: Performed by: INTERNAL MEDICINE

## 2021-10-13 PROCEDURE — 36415 COLL VENOUS BLD VENIPUNCTURE: CPT

## 2021-10-13 PROCEDURE — 6370000000 HC RX 637 (ALT 250 FOR IP): Performed by: INTERNAL MEDICINE

## 2021-10-13 PROCEDURE — 80053 COMPREHEN METABOLIC PANEL: CPT

## 2021-10-13 PROCEDURE — 1200000000 HC SEMI PRIVATE

## 2021-10-13 PROCEDURE — 99232 SBSQ HOSP IP/OBS MODERATE 35: CPT | Performed by: INTERNAL MEDICINE

## 2021-10-13 PROCEDURE — 6360000002 HC RX W HCPCS: Performed by: NURSE PRACTITIONER

## 2021-10-13 PROCEDURE — 84484 ASSAY OF TROPONIN QUANT: CPT

## 2021-10-13 PROCEDURE — 2500000003 HC RX 250 WO HCPCS: Performed by: NURSE PRACTITIONER

## 2021-10-13 PROCEDURE — 93010 ELECTROCARDIOGRAM REPORT: CPT | Performed by: INTERNAL MEDICINE

## 2021-10-13 PROCEDURE — 85025 COMPLETE CBC W/AUTO DIFF WBC: CPT

## 2021-10-13 RX ORDER — FUROSEMIDE 10 MG/ML
40 INJECTION INTRAMUSCULAR; INTRAVENOUS ONCE
Status: COMPLETED | OUTPATIENT
Start: 2021-10-13 | End: 2021-10-13

## 2021-10-13 RX ORDER — DIGOXIN 0.25 MG/ML
250 INJECTION INTRAMUSCULAR; INTRAVENOUS
Status: COMPLETED | OUTPATIENT
Start: 2021-10-13 | End: 2021-10-13

## 2021-10-13 RX ORDER — DILTIAZEM HYDROCHLORIDE 5 MG/ML
10 INJECTION INTRAVENOUS ONCE
Status: COMPLETED | OUTPATIENT
Start: 2021-10-13 | End: 2021-10-13

## 2021-10-13 RX ORDER — METOPROLOL TARTRATE 5 MG/5ML
5 INJECTION INTRAVENOUS ONCE
Status: COMPLETED | OUTPATIENT
Start: 2021-10-13 | End: 2021-10-13

## 2021-10-13 RX ADMIN — DIGOXIN 250 MCG: 250 INJECTION, SOLUTION INTRAMUSCULAR; INTRAVENOUS; PARENTERAL at 15:10

## 2021-10-13 RX ADMIN — EXTENDED PHENYTOIN SODIUM 30 MG: 30 CAPSULE ORAL at 09:16

## 2021-10-13 RX ADMIN — ENOXAPARIN SODIUM 40 MG: 40 INJECTION SUBCUTANEOUS at 09:16

## 2021-10-13 RX ADMIN — DILTIAZEM HYDROCHLORIDE 180 MG: 180 CAPSULE, COATED, EXTENDED RELEASE ORAL at 09:15

## 2021-10-13 RX ADMIN — ENOXAPARIN SODIUM 80 MG: 80 INJECTION SUBCUTANEOUS at 20:32

## 2021-10-13 RX ADMIN — FUROSEMIDE 40 MG: 10 INJECTION, SOLUTION INTRAMUSCULAR; INTRAVENOUS at 11:59

## 2021-10-13 RX ADMIN — FUROSEMIDE 40 MG: 40 TABLET ORAL at 09:15

## 2021-10-13 RX ADMIN — DOCUSATE SODIUM 100 MG: 100 CAPSULE ORAL at 09:15

## 2021-10-13 RX ADMIN — AMOXICILLIN AND CLAVULANATE POTASSIUM 1 TABLET: 875; 125 TABLET, FILM COATED ORAL at 20:31

## 2021-10-13 RX ADMIN — LISINOPRIL 2.5 MG: 2.5 TABLET ORAL at 09:15

## 2021-10-13 RX ADMIN — METOPROLOL TARTRATE 50 MG: 50 TABLET, FILM COATED ORAL at 09:15

## 2021-10-13 RX ADMIN — DIGOXIN 250 MCG: 250 INJECTION, SOLUTION INTRAMUSCULAR; INTRAVENOUS; PARENTERAL at 13:16

## 2021-10-13 RX ADMIN — AMOXICILLIN AND CLAVULANATE POTASSIUM 1 TABLET: 875; 125 TABLET, FILM COATED ORAL at 09:16

## 2021-10-13 RX ADMIN — METOPROLOL TARTRATE 50 MG: 50 TABLET, FILM COATED ORAL at 20:31

## 2021-10-13 RX ADMIN — METOPROLOL TARTRATE 5 MG: 1 INJECTION, SOLUTION INTRAVENOUS at 05:25

## 2021-10-13 RX ADMIN — DILTIAZEM HYDROCHLORIDE 10 MG: 5 INJECTION INTRAVENOUS at 07:24

## 2021-10-13 RX ADMIN — Medication 81 MG: at 09:16

## 2021-10-13 NOTE — CONSULTS
Attestation signed by      Attending Physician Statement:    I have discussed the care of  Wilma Richardson , including pertinent history and exam findings, with the Cardiology fellow/resident. I have seen and examined the patient and the key elements of all parts of the encounter have been performed by me. I agree with the assessment, plan and orders as documented by the fellow/resident, after I modified exam findings and plan of treatments, and the final version is my approved version of the assessment. Additional Comments:   A. fib with RVR without any symptom most likely chronic possibility the patient may be in it and not feeling it. Patient is already on beta-blocker calcium channel blocker  We will add digoxin for rate control  Patient need anticoagulation if it is okay with all  Given the history of stents we will get echocardiogram if it is high risk we will talk about the cath. Continue rest  Dr. Jonathon Neves Cardiology Cardiology    Consult / H&P               Today's Date: 10/13/2021  Patient Name: Wilma Richardson  Date of admission: 10/10/2021  9:25 AM  Patient's age: 61 y.o., 1955  Admission Dx: Abdominal pain [R10.9]    Reason for Consult:  Cardiac evaluation    Requesting Physician: Shiraz Williamson MD    CHIEF COMPLAINT:  Abdominal pain     History Obtained From:  patient    HISTORY OF PRESENT ILLNESS:      60 yo male with h/o HTN was transferred for evaluation of RUQ pain   Had MRCP done by GI   Cardiology was consulted for Atrial fibrillation. EKG showed Afib with RVR with ST depression in lateral leads   Denies any chest pain. No recent ischemia workup    Past Medical History:   has a past medical history of Atrial fibrillation ([address] CAD (coronary artery disease), CHF (congestive heart failure) ([address] Depression, Hypertension, Nonthrombocytopenic purpura ([address] and Stroke (cerebrum) ([address]     Past Surgical History:   has a past surgical history that includes Cholecystectomy (12/04/2019); Gastrostomy tube placement (05/22/2019); tracheostomy; tracheostomy (05/22/2019); Knee arthroscopy; and Coronary angioplasty with stent (12/2017). Home Medications:    Prior to Admission medications    Medication Sig Start Date End Date Taking? Authorizing Provider   acetaminophen (TYLENOL) 325 MG tablet Take 650 mg by mouth every 4 hours as needed for Pain   Yes Historical Provider, MD   aspirin EC 81 MG EC tablet Take 81 mg by mouth daily   Yes Historical Provider, MD   atorvastatin (LIPITOR) 40 MG tablet Take 40 mg by mouth daily   Yes Historical Provider, MD   dilTIAZem (CARDIZEM CD) 180 MG extended release capsule Take 180 mg by mouth daily   Yes Historical Provider, MD   vitamin D (CHOLECALCIFEROL) 25 MCG (1000 UT) TABS tablet Take 400 Units by mouth daily   Yes Historical Provider, MD   phenytoin (DILANTIN) 30 MG ER capsule Take 30 mg by mouth daily   Yes Historical Provider, MD   docusate sodium (COLACE) 100 MG capsule Take 100 mg by mouth daily   Yes Historical Provider, MD   furosemide (LASIX) 40 MG tablet Take 40 mg by mouth daily   Yes Historical Provider, MD   hydrOXYzine (ATARAX) 10 MG tablet Take 10 mg by mouth nightly as needed for Itching   Yes Historical Provider, MD   lisinopril (PRINIVIL;ZESTRIL) 2.5 MG tablet Take 2.5 mg by mouth daily   Yes Historical Provider, MD   loratadine (CLARITIN) 10 MG capsule Take 10 mg by mouth daily   Yes Historical Provider, MD   oxyCODONE-acetaminophen (PERCOCET) 5-325 MG per tablet Take 1 tablet by mouth 2 times daily.    Yes Historical Provider, MD   famotidine (PEPCID) 20 MG tablet Take 20 mg by mouth daily   Yes Historical Provider, MD   mirtazapine (REMERON SOL-TAB) 15 MG disintegrating tablet Take 15 mg by mouth nightly   Yes Historical Provider, MD   Bisoprolol Fumarate (ZEBETA PO) Take 20 mg by mouth daily   Yes Historical Provider, MD      Current Facility-Administered Medications: furosemide (LASIX) injection 40 mg, 40 mg, IntraVENous, Once  dilTIAZem (CARDIZEM CD) extended release capsule 180 mg, 180 mg, Oral, Daily  aspirin EC tablet 81 mg, 81 mg, Oral, Daily  lisinopril (PRINIVIL;ZESTRIL) tablet 2.5 mg, 2.5 mg, Oral, Daily  metoprolol tartrate (LOPRESSOR) tablet 50 mg, 50 mg, Oral, BID  amoxicillin-clavulanate (AUGMENTIN) 875-125 MG per tablet 1 tablet, 1 tablet, Oral, 2 times per day  furosemide (LASIX) tablet 40 mg, 40 mg, Oral, Daily  sodium chloride flush 0.9 % injection 5-40 mL, 5-40 mL, IntraVENous, 2 times per day  sodium chloride flush 0.9 % injection 10 mL, 10 mL, IntraVENous, PRN  0.9 % sodium chloride infusion, 25 mL, IntraVENous, PRN  potassium chloride (KLOR-CON M) extended release tablet 40 mEq, 40 mEq, Oral, PRN **OR** potassium bicarbonate (K-LYTE) disintegrating tablet 50 mEq, 50 mEq, Oral, PRN **OR** potassium chloride 10 mEq/100 mL IVPB (Peripheral Line), 10 mEq, IntraVENous, PRN  magnesium sulfate 1000 mg in dextrose 5% 100 mL IVPB, 1,000 mg, IntraVENous, PRN  enoxaparin (LOVENOX) injection 40 mg, 40 mg, SubCUTAneous, Daily  ondansetron (ZOFRAN-ODT) disintegrating tablet 4 mg, 4 mg, Oral, Q8H PRN **OR** ondansetron (ZOFRAN) injection 4 mg, 4 mg, IntraVENous, Q6H PRN  polyethylene glycol (GLYCOLAX) packet 17 g, 17 g, Oral, Daily PRN  acetaminophen (TYLENOL) tablet 650 mg, 650 mg, Oral, Q6H PRN **OR** acetaminophen (TYLENOL) suppository 650 mg, 650 mg, Rectal, Q6H PRN  phenytoin (DILANTIN) ER capsule 30 mg, 30 mg, Oral, Daily  docusate sodium (COLACE) capsule 100 mg, 100 mg, Oral, Daily  sodium chloride flush 0.9 % injection 5-40 mL, 5-40 mL, IntraVENous, BID  0.9 % sodium chloride bolus, 500 mL, IntraVENous, Once    Allergies:  Patient has no known allergies. Social History:   reports that he has quit smoking. He has never used smokeless tobacco. He reports that he does not drink alcohol and does not use drugs.      Family History: family history includes Heart Disease in his father; No Known Problems in his mother. No h/o sudden cardiac death. No for premature CAD    REVIEW OF SYSTEMS:    · Constitutional: there has been no unanticipated weight loss. There's been No change in energy level, No change in activity level. · Eyes: No visual changes or diplopia. No scleral icterus. · ENT: No Headaches  · Cardiovascular: No cardiac history  · Respiratory: No previous pulmonary problems, No cough  · Gastrointestinal: No abdominal pain. No change in bowel or bladder habits. · Genitourinary: No dysuria, trouble voiding, or hematuria. · Musculoskeletal:  No gait disturbance, No weakness or joint complaints. PHYSICAL EXAM:      /70   Pulse 127   Temp 97.5 °F (36.4 °C) (Oral)   Resp 23   Ht 6' (1.829 m)   Wt 180 lb 6.4 oz (81.8 kg)   SpO2 97%   BMI 24.47 kg/m²    Constitutional and General Appearance: alert, cooperative, no distress and appears stated age  HEENT: PERRL, no cervical lymphadenopathy. No masses palpable. Normal oral mucosa  Respiratory:  · Normal excursion and expansion without use of accessory muscles  · Resp Auscultation: Good respiratory effort. No for increased work of breathing.  On auscultation: clear to auscultation bilaterally  Cardiovascular:  · The apical impulse is not displaced  · Heart tones are crisp and normal. regular S1 and S2.  · Jugular venous pulsation Normal  · The carotid upstroke is normal in amplitude and contour without delay or bruit  · Peripheral pulses are symmetrical and full   Abdomen:   · No masses or tenderness  · Bowel sounds present  Extremities:  ·  No Cyanosis or Clubbing  ·  Lower extremity edema: No  ·  Skin: Warm and dry  ·     DATA:      Labs:     CBC:   Recent Labs     10/12/21  0423 10/13/21  0545   WBC 6.3 8.4   HGB 13.2 14.2   HCT 42.5 45.9    217     BMP:   Recent Labs     10/12/21  2031 10/13/21  0545    137   K 3.3* 4.7   CO2 21 18*   BUN 8 10   CREATININE 0.64* 0.54*   LABGLOM >60 >60   GLUCOSE 73 63*     BNP: No results for input(s): BNP in the last 72 hours. PT/INR: No results for input(s): PROTIME, INR in the last 72 hours. APTT:No results for input(s): APTT in the last 72 hours. CARDIAC ENZYMES:No results for input(s): CKTOTAL, CKMB, CKMBINDEX, TROPONINI in the last 72 hours. FASTING LIPID PANEL:No results found for: HDL, LDLDIRECT, LDLCALC, TRIG  LIVER PROFILE:  Recent Labs     10/12/21  2031 10/13/21  0545   AST 47* 56*   ALT 82* 81*   LABALBU 2.9* 2.8*       IMPRESSION:    Patient Active Problem List   Diagnosis    Abdominal pain    Choledocholithiasis    Hypotension due to hypovolemia    History of completed stroke with residual left hemiparesis    Left hemiparesis (HCC)    Essential hypertension    Atrial fibrillation (HCC)    Coronary artery disease involving native coronary artery of native heart without angina pectoris    Stroke (cerebrum) (HCC)    Nonthrombocytopenic purpura (HCC)    Obstructive jaundice    Leukopenia    Anemia, normocytic normochromic    Elevated LFTs    History of CHF (congestive heart failure)     1. Afib with RVR [id number] Score: 3  2. Choledocolithiasis  3 HTN  4 H/o CVA  5 H/o CAD s/p PCI in past. Will try to obtain records. 5 Elevated LFTs    RECOMMENDATIONS:  1. EKG showed Afib with RVR. Will need long term AC  2. Will start heparin gtt   3. On cardizem  180 mg and Lopressor   4. Will add Digoxin. 5. Will order 2 D ECHO       Discussed with patient and Nurse.     Electronically signed by Lennox Simpers, MD on 10/13/2021 at [facility]

## 2021-10-13 NOTE — PLAN OF CARE
Problem: Falls - Risk of:  Goal: Will remain free from falls  Description: Will remain free from falls  10/13/2021 1610 by Consuelo Soriano RN  Outcome: Met This Shift  10/13/2021 0333 by Brenda Marques RN  Outcome: Ongoing  Goal: Absence of physical injury  Description: Absence of physical injury  10/13/2021 1610 by Consuelo Soriano RN  Outcome: Met This Shift  10/13/2021 0333 by Brenda Marques RN  Outcome: Ongoing     Problem: Infection:  Goal: Will remain free from infection  Description: Will remain free from infection  10/13/2021 1610 by Consuelo Soriano RN  Outcome: Ongoing  10/13/2021 0333 by Brenda Marques RN  Outcome: Ongoing     Problem: Safety:  Goal: Free from accidental physical injury  Description: Free from accidental physical injury  10/13/2021 1610 by Consuelo Soriano RN  Outcome: Ongoing  10/13/2021 0333 by Brenda Marques RN  Outcome: Ongoing  Goal: Free from intentional harm  Description: Free from intentional harm  10/13/2021 1610 by Consuelo Soriano RN  Outcome: Ongoing  10/13/2021 0333 by Brenda Marques RN  Outcome: Ongoing     Problem: Daily Care:  Goal: Daily care needs are met  Description: Daily care needs are met  10/13/2021 1610 by Consuelo Soriano RN  Outcome: Ongoing  10/13/2021 0333 by Brenda Marques RN  Outcome: Ongoing     Problem: Pain:  Goal: Patient's pain/discomfort is manageable  Description: Patient's pain/discomfort is manageable  10/13/2021 1610 by Consuelo Soriano RN  Outcome: Ongoing  10/13/2021 0333 by Brenda Marques RN  Outcome: Ongoing     Problem: Skin Integrity:  Goal: Skin integrity will stabilize  Description: Skin integrity will stabilize  10/13/2021 1610 by Consuelo Soriano RN  Outcome: Ongoing  10/13/2021 0333 by Brenda Marques RN  Outcome: Ongoing     Problem: Discharge Planning:  Goal: Patients continuum of care needs are met  Description: Patients continuum of care needs are met  10/13/2021 1610 by Consuelo Soriano RN  Outcome: Ongoing  10/13/2021 0333 by Autumn Kern RN  Outcome: Ongoing     Problem: Skin Integrity:  Goal: Will show no infection signs and symptoms  Description: Will show no infection signs and symptoms  10/13/2021 1610 by Martha Kinney RN  Outcome: Ongoing  10/13/2021 0333 by Autumn Kern RN  Outcome: Ongoing  Goal: Absence of new skin breakdown  Description: Absence of new skin breakdown  10/13/2021 1610 by Martha Kinney RN  Outcome: Ongoing  10/13/2021 0333 by Autumn Kern RN  Outcome: Ongoing     Problem: Musculor/Skeletal Functional Status  Goal: Highest potential functional level  10/13/2021 1610 by Martha Kinney RN  Outcome: Ongoing  10/13/2021 0333 by Autumn Kern RN  Outcome: Ongoing

## 2021-10-13 NOTE — PROGRESS NOTES
Notified Dr. Christopher Lind that patient's heart rate has been running 120's-160's in Afib. Patient has received all available meds for heart rate. Will continue to monitor.

## 2021-10-13 NOTE — PROGRESS NOTES
[facility]  Office: [address], DO, Jamesaudie Page, DO, Any Quintanilla, DO, Ford Contreras, DO, Andreia Augustin MD, Alannah Rae MD, Fiorella Baez MD, Mariusz Washington MD, Joao Knowles MD, Christiano East MD, Chelle Mejia MD, Benny Vitale MD, Penny Gilbert, DO, Avis Jack DO, Arian Mcintyre MD,  Bhavani Rodriguez DO, Caio España MD, Murphy Mullen MD, Nolvia Watts MD, Heidi Styles MD, Tino Morrissey MD, Irma Berrios MD, Kisha Jett, [facility], [facility], [facility], Adriana French, [facility], Ghassan Hammond, CNS, Peyman Gomez, CNP, Emily Gandhi, CNP, Chelsea Tao, CNP, Valerie Stewart, CNP, Saqib Yeager, CNP, Fernando Segura, CNP, Kimberly Freeman PA-C, Dee Cruz, [facility], Brittany Caldwell, CNP, Allen Garza, CNP, Mica Champion, CNP, Danna Fox, CNP, Kaci Anderson, CNP, Avis Bermudez, [facility], Janie Sharif, [address] [place] [address]    Progress Note    10/13/2021    2:26 PM    Name:   Polo Bar  MRN:     [id number]     [place]:      [de-identified]   Room:   [address] [place] [address] Day:  3  Admit Date:  10/10/2021  9:25 AM    PCP:   No primary care provider on file. Code Status:  Full Code    Subjective:     C/C: Abdominal pain    Interval History Status:   Patient does appear more comfortable this morning. However he is still in A. fib with RVR. Cardiology was consulted and added digoxin and heparin drip. Echo was also ordered and is pending completion.        Brief History:     (per HPI) Swati Sangeetha a 61 y.o.  male who presents with   Right upper quadrant abdominal pain     82-year-old male transferred from [facility] and treatment of suspected choledocho lithiasis  Patient is status post cholecystectomy  He has been having right upper quadrant abdominal pain     Initial work-up in [place] revealed transaminasemia and concerns of choledocholithiasis  He apparently did require pressors for blood pressure support last PM  Results no available for review at this time     The daughter reports that the patient has been residing in a nursing facility for the last 3 years following a stroke    10/12/2021    Patient denies any abdominal pain today  MRCP negative for stone/obstruction, or GI probably he has passed the stone  Bilirubin and transaminases are trending down  GI has signed off  Patient appears to be in A. fib with RVR, got one-time dose of IV Lopressor earlier morning and is oral home medicines were on hold    Review of Systems:     Constitutional:  negative for chills, fevers, sweats  Respiratory:  negative for cough, dyspnea on exertion, shortness of breath, wheezing  Cardiovascular:  negative for chest pain, chest pressure/discomfort, lower extremity edema, palpitations  Gastrointestinal: Negative for abdominal pain, denies constipation, diarrhea, nausea, vomiting  Neurological:  negative for dizziness, headache    Medications:      Allergies:  No Known Allergies    Current Meds:   Scheduled Meds:    enoxaparin  1 mg/kg SubCUTAneous BID    digoxin  250 mcg IntraVENous Q2H    dilTIAZem  180 mg Oral Daily    aspirin EC  81 mg Oral Daily    lisinopril  2.5 mg Oral Daily    metoprolol tartrate  50 mg Oral BID    amoxicillin-clavulanate  1 tablet Oral 2 times per day    furosemide  40 mg Oral Daily    sodium chloride flush  5-40 mL IntraVENous 2 times per day    phenytoin  30 mg Oral Daily    docusate sodium  100 mg Oral Daily    sodium chloride flush  5-40 mL IntraVENous BID    sodium chloride  500 mL IntraVENous Once     Continuous Infusions:    sodium chloride       PRN Meds: sodium chloride flush, sodium chloride, potassium chloride **OR** potassium alternative oral replacement **OR** potassium chloride, magnesium sulfate, ondansetron **OR** ondansetron, polyethylene glycol, acetaminophen **OR** acetaminophen    Data:     Past Medical History:   has a past medical history of Atrial fibrillation [facility]), CAD (coronary artery disease), CHF (congestive heart failure) ([facility] Depression, Hypertension, Nonthrombocytopenic purpura ([facility] and Stroke (cerebrum) ([facility] Social History:   reports that he has quit smoking. He has never used smokeless tobacco. He reports that he does not drink alcohol and does not use drugs. Family History:   Family History   Problem Relation Age of Onset    No Known Problems Mother     Heart Disease Father        Vitals:  /73   Pulse 133   Temp 97.8 °F (36.6 °C) (Oral)   Resp 20   Ht 6' (1.829 m)   Wt 180 lb 6.4 oz (81.8 kg)   SpO2 98%   BMI 24.47 kg/m²   Temp (24hrs), Av.1 °F (36.7 °C), Min:97.5 °F (36.4 °C), Max:98.6 °F (37 °C)    No results for input(s): POCGLU in the last 72 hours. I/O (24Hr):     Intake/Output Summary (Last 24 hours) at 10/13/2021 1426  Last data filed at 10/13/2021 0001  Gross per 24 hour   Intake    Output 2000 ml   Net -2000 ml       Labs:  Hematology:  Recent Labs     10/11/21  0706 10/12/21  0423 10/13/21  0545   WBC 6.4 6.3 8.4   RBC 4.20* 4.19* 4.48   HGB 13.3 13.2 14.2   HCT 40.8 42.5 45.9   MCV 97.1 101.4 102.5   MCH 31.7 31.5 31.7   MCHC 32.6 31.1 30.9   RDW 14.6* 14.4 14.2   PLT See Reflexed IPF Result 163 217   MPV NOT REPORTED 11.5 11.2     Chemistry:  Recent Labs     10/12/21  0423 10/12/21  2031 10/13/21  0545 10/13/21  1146    139 137  --    K 3.7 3.3* 4.7  --     102 103  --    CO2 19* 21 18*  --    GLUCOSE 80 73 63*  --    BUN 9 8 10  --    CREATININE 0.61* 0.64* 0.54*  --    MG 1.9 2.3  --   --    ANIONGAP 11 16 16  --    LABGLOM >60 >60 >60  --    GFRAA >60 >60 >60  --    CALCIUM 8.2* 8.3* 8.5*  --    PROBNP  --   --  4,301*  --    TROPHS  --   --   --  28*     Recent Labs     10/11/21  0706 10/11/21  0706 10/12/21  0423 10/12/21  2031 10/13/21  0545   PROT 5.2*   < > 5.3* 5.9* 5.8*   LABALBU 2.7*   < > 2.6* 2.9* 2.8*   AST 54*   < > 43* 47* 56*   ALT 90*   < > 74* 82* 81*   ALKPHOS 192*   < > 205* 228* 202*   BILITOT 4.29*   < > 3.00* 3.02* 2.61*   BILIDIR 3.34*  --   --   --   --     < > = values in this interval not displayed. ABG:No results found for: POCPH, PHART, PH, POCPCO2, SOX7SAR, PCO2, POCPO2, PO2ART, PO2, POCHCO3, BTE7HHZ, HCO3, NBEA, PBEA, BEART, BE, THGBART, THB, AJO2YPB, MVJH6QPF, T7TOBJSX, O2SAT, FIO2  Lab Results   Component Value Date/Time    SPECIAL NOT REPORTED 10/10/2021 05:35 PM     Lab Results   Component Value Date/Time    CULTURE NO GROWTH 10/10/2021 05:35 PM       Radiology:  MRI ABDOMEN W WO CONTRAST MRCP    Result Date: 10/10/2021  1. Some technical limitations resulting in blurring of detail and misregistration artifact on a number of the imaging series. 2. Unremarkable appearance of the common bile duct/hepatic duct status post cholecystectomy. No stricture evident. 3. Minimal central hepatic duct dilatation is demonstrated, not unusual following cholecystectomy. Physical Examination:        General appearance: Ill-looking, breathing appears less labored, not able to communicate properly due to previous stroke  Mental Status:  Answers appropriately  Lungs:  + Bibasilar Rales, improving  Heart: Irregular rhythm, tachycardic, EKG confirmed A. fib with RVR  Abdomen: Negative for abdominal discomfort  Extremities:  no edema, redness, tenderness in the calves  Skin:  no gross lesions, rashes, induration    Assessment:        Hospital Problems         Last Modified POA    * (Principal) Choledocholithiasis 10/10/2021 Yes    Abdominal pain 10/9/2021 Yes    Hypotension due to hypovolemia 10/10/2021 Yes    History of completed stroke with residual left hemiparesis 10/10/2021 Yes    Left hemiparesis ([address] 10/10/2021 Yes    Essential hypertension 10/10/2021 Yes    Atrial fibrillation ([address] 10/10/2021 Yes    Coronary artery disease involving native coronary artery of native heart without angina pectoris 10/10/2021 Yes    Stroke (cerebrum) ([address] 10/10/2021 Yes    Nonthrombocytopenic purpura ([facility] 10/10/2021 Yes    Obstructive jaundice 10/10/2021 Yes    Leukopenia 10/10/2021 Yes    Anemia, normocytic normochromic 10/10/2021 Yes    Elevated LFTs 10/11/2021 Yes    History of CHF (congestive heart failure) 10/12/2021 Yes          Plan:        1. Give 40 mg IV Lasix one-time  2. Continue oral Augmentin  3. Continue home dose of Cardizem CD and stared Lopressor 50 mg twice daily which is replacement of bisoprolol as per hospital formulary  4. Echo ordered, pending result cardiology may consider cath  5.  Cardiology consulted--they begin digoxin and started heparin drip for anticoagulation      He will be discharged back to SNF when hemodynamically stable     Allyssa Kenny MD  10/13/2021  2:26 PM

## 2021-10-13 NOTE — PROGRESS NOTES
[facility]up[facility] [employer]  Occupational Therapy Not Seen Note    DATE: 10/13/2021    NAME: Heaven Ferris  MRN: [id number]   : 1955      Patient not seen this date for Occupational Therapy due to: Other: Pt -160 supine in bed. Pt not appropriate for therapy this day.          Electronically signed by KEITH Romo on 10/13/2021 at 3:08 PM

## 2021-10-14 LAB
ABSOLUTE EOS #: 0.73 K/UL (ref 0–0.44)
ABSOLUTE IMMATURE GRANULOCYTE: 0.03 K/UL (ref 0–0.3)
ABSOLUTE LYMPH #: 1.17 K/UL (ref 1.1–3.7)
ABSOLUTE MONO #: 0.68 K/UL (ref 0.1–1.2)
ALBUMIN SERPL-MCNC: 2.7 G/DL (ref 3.5–5.2)
ALBUMIN SERPL-MCNC: 3.1 G/DL (ref 3.5–5.2)
ALBUMIN/GLOBULIN RATIO: 0.9 (ref 1–2.5)
ALBUMIN/GLOBULIN RATIO: 1.1 (ref 1–2.5)
ALP BLD-CCNC: 165 U/L (ref 40–129)
ALP BLD-CCNC: 168 U/L (ref 40–129)
ALT SERPL-CCNC: 100 U/L (ref 5–41)
ALT SERPL-CCNC: 83 U/L (ref 5–41)
ANION GAP SERPL CALCULATED.3IONS-SCNC: 10 MMOL/L (ref 9–17)
ANION GAP SERPL CALCULATED.3IONS-SCNC: 11 MMOL/L (ref 9–17)
AST SERPL-CCNC: 50 U/L
AST SERPL-CCNC: 58 U/L
BASOPHILS # BLD: 1 % (ref 0–2)
BASOPHILS ABSOLUTE: 0.06 K/UL (ref 0–0.2)
BILIRUB SERPL-MCNC: 1.96 MG/DL (ref 0.3–1.2)
BILIRUB SERPL-MCNC: 1.98 MG/DL (ref 0.3–1.2)
BUN BLDV-MCNC: 9 MG/DL (ref 8–23)
BUN BLDV-MCNC: 9 MG/DL (ref 8–23)
BUN/CREAT BLD: ABNORMAL (ref 9–20)
BUN/CREAT BLD: ABNORMAL (ref 9–20)
CALCIUM SERPL-MCNC: 8 MG/DL (ref 8.6–10.4)
CALCIUM SERPL-MCNC: 8.4 MG/DL (ref 8.6–10.4)
CHLORIDE BLD-SCNC: 102 MMOL/L (ref 98–107)
CHLORIDE BLD-SCNC: 104 MMOL/L (ref 98–107)
CO2: 23 MMOL/L (ref 20–31)
CO2: 24 MMOL/L (ref 20–31)
CREAT SERPL-MCNC: 0.56 MG/DL (ref 0.7–1.2)
CREAT SERPL-MCNC: 0.56 MG/DL (ref 0.7–1.2)
DIFFERENTIAL TYPE: ABNORMAL
EOSINOPHILS RELATIVE PERCENT: 11 % (ref 1–4)
GFR AFRICAN AMERICAN: >60 ML/MIN
GFR AFRICAN AMERICAN: >60 ML/MIN
GFR NON-AFRICAN AMERICAN: >60 ML/MIN
GFR NON-AFRICAN AMERICAN: >60 ML/MIN
GFR SERPL CREATININE-BSD FRML MDRD: ABNORMAL ML/MIN/{1.73_M2}
GLUCOSE BLD-MCNC: 89 MG/DL (ref 70–99)
GLUCOSE BLD-MCNC: 91 MG/DL (ref 70–99)
HCT VFR BLD CALC: 44.6 % (ref 40.7–50.3)
HEMOGLOBIN: 14.2 G/DL (ref 13–17)
IMMATURE GRANULOCYTES: 1 %
LYMPHOCYTES # BLD: 18 % (ref 24–43)
MCH RBC QN AUTO: 31.6 PG (ref 25.2–33.5)
MCHC RBC AUTO-ENTMCNC: 31.8 G/DL (ref 28.4–34.8)
MCV RBC AUTO: 99.3 FL (ref 82.6–102.9)
MONOCYTES # BLD: 11 % (ref 3–12)
NRBC AUTOMATED: 0.3 PER 100 WBC
PDW BLD-RTO: 14.1 % (ref 11.8–14.4)
PLATELET # BLD: 256 K/UL (ref 138–453)
PLATELET ESTIMATE: ABNORMAL
PMV BLD AUTO: 11.2 FL (ref 8.1–13.5)
POTASSIUM SERPL-SCNC: 3.6 MMOL/L (ref 3.7–5.3)
POTASSIUM SERPL-SCNC: 4 MMOL/L (ref 3.7–5.3)
RBC # BLD: 4.49 M/UL (ref 4.21–5.77)
RBC # BLD: ABNORMAL 10*6/UL
SEG NEUTROPHILS: 58 % (ref 36–65)
SEGMENTED NEUTROPHILS ABSOLUTE COUNT: 3.81 K/UL (ref 1.5–8.1)
SODIUM BLD-SCNC: 137 MMOL/L (ref 135–144)
SODIUM BLD-SCNC: 137 MMOL/L (ref 135–144)
TOTAL PROTEIN: 5.7 G/DL (ref 6.4–8.3)
TOTAL PROTEIN: 5.9 G/DL (ref 6.4–8.3)
WBC # BLD: 6.5 K/UL (ref 3.5–11.3)
WBC # BLD: ABNORMAL 10*3/UL

## 2021-10-14 PROCEDURE — 6370000000 HC RX 637 (ALT 250 FOR IP): Performed by: INTERNAL MEDICINE

## 2021-10-14 PROCEDURE — 36415 COLL VENOUS BLD VENIPUNCTURE: CPT

## 2021-10-14 PROCEDURE — 6360000002 HC RX W HCPCS: Performed by: INTERNAL MEDICINE

## 2021-10-14 PROCEDURE — 85025 COMPLETE CBC W/AUTO DIFF WBC: CPT

## 2021-10-14 PROCEDURE — 97110 THERAPEUTIC EXERCISES: CPT

## 2021-10-14 PROCEDURE — 80053 COMPREHEN METABOLIC PANEL: CPT

## 2021-10-14 PROCEDURE — 97530 THERAPEUTIC ACTIVITIES: CPT

## 2021-10-14 PROCEDURE — 99232 SBSQ HOSP IP/OBS MODERATE 35: CPT | Performed by: INTERNAL MEDICINE

## 2021-10-14 PROCEDURE — 1200000000 HC SEMI PRIVATE

## 2021-10-14 PROCEDURE — 2580000003 HC RX 258: Performed by: NURSE PRACTITIONER

## 2021-10-14 RX ADMIN — AMOXICILLIN AND CLAVULANATE POTASSIUM 1 TABLET: 875; 125 TABLET, FILM COATED ORAL at 10:11

## 2021-10-14 RX ADMIN — FUROSEMIDE 40 MG: 40 TABLET ORAL at 10:11

## 2021-10-14 RX ADMIN — EXTENDED PHENYTOIN SODIUM 30 MG: 30 CAPSULE ORAL at 10:10

## 2021-10-14 RX ADMIN — ENOXAPARIN SODIUM 80 MG: 80 INJECTION SUBCUTANEOUS at 21:18

## 2021-10-14 RX ADMIN — SODIUM CHLORIDE, PRESERVATIVE FREE 10 ML: 5 INJECTION INTRAVENOUS at 21:23

## 2021-10-14 RX ADMIN — SODIUM CHLORIDE, PRESERVATIVE FREE 10 ML: 5 INJECTION INTRAVENOUS at 21:22

## 2021-10-14 RX ADMIN — AMOXICILLIN AND CLAVULANATE POTASSIUM 1 TABLET: 875; 125 TABLET, FILM COATED ORAL at 21:20

## 2021-10-14 RX ADMIN — DOCUSATE SODIUM 100 MG: 100 CAPSULE ORAL at 10:11

## 2021-10-14 RX ADMIN — DILTIAZEM HYDROCHLORIDE 180 MG: 180 CAPSULE, COATED, EXTENDED RELEASE ORAL at 10:11

## 2021-10-14 RX ADMIN — Medication 81 MG: at 10:11

## 2021-10-14 RX ADMIN — ENOXAPARIN SODIUM 80 MG: 80 INJECTION SUBCUTANEOUS at 10:11

## 2021-10-14 RX ADMIN — METOPROLOL TARTRATE 50 MG: 50 TABLET, FILM COATED ORAL at 10:11

## 2021-10-14 RX ADMIN — METOPROLOL TARTRATE 50 MG: 50 TABLET, FILM COATED ORAL at 21:20

## 2021-10-14 ASSESSMENT — PAIN SCALES - GENERAL
PAINLEVEL_OUTOF10: 0
PAINLEVEL_OUTOF10: 0

## 2021-10-14 NOTE — PLAN OF CARE
Problem: Infection:  Goal: Will remain free from infection  Description: Will remain free from infection  10/14/2021 0507 by Lesly Rosen RN  Outcome: Ongoing  10/13/2021 1610 by Rigoberto Patel RN  Outcome: Ongoing     Problem: Safety:  Goal: Free from accidental physical injury  Description: Free from accidental physical injury  10/14/2021 0507 by Lesly Rosen RN  Outcome: Ongoing  10/13/2021 1610 by Rigoberto Patel RN  Outcome: Ongoing  Goal: Free from intentional harm  Description: Free from intentional harm  10/14/2021 0507 by Lesly Rosen RN  Outcome: Ongoing  10/13/2021 1610 by Rigoberto Patel RN  Outcome: Ongoing     Problem: Daily Care:  Goal: Daily care needs are met  Description: Daily care needs are met  10/14/2021 0507 by Lesly Rosen RN  Outcome: Ongoing  10/13/2021 1610 by Rigoberto Patel RN  Outcome: Ongoing     Problem: Pain:  Goal: Patient's pain/discomfort is manageable  Description: Patient's pain/discomfort is manageable  10/14/2021 0507 by Lesly Rosen RN  Outcome: Ongoing  10/13/2021 1610 by Rigoberto Patel RN  Outcome: Ongoing     Problem: Skin Integrity:  Goal: Skin integrity will stabilize  Description: Skin integrity will stabilize  10/14/2021 0507 by Lesly Rosen RN  Outcome: Ongoing  10/13/2021 1610 by Rigoberto Patel RN  Outcome: Ongoing     Problem: Discharge Planning:  Goal: Patients continuum of care needs are met  Description: Patients continuum of care needs are met  10/14/2021 0507 by Lesly Rosen RN  Outcome: Ongoing  10/13/2021 1610 by Rigoberto Patel RN  Outcome: Ongoing     Problem: Falls - Risk of:  Goal: Will remain free from falls  Description: Will remain free from falls  10/14/2021 0507 by Lesly Rosen RN  Outcome: Ongoing  10/13/2021 1610 by Rigoberto Patel RN  Outcome: Met This Shift  Goal: Absence of physical injury  Description: Absence of physical injury  10/14/2021 0507 by Lesly Rosen RN  Outcome: Ongoing  10/13/2021 1610 by Rigoberto Patel RN  Outcome: Met This Shift     Problem: Skin Integrity:  Goal: Will show no infection signs and symptoms  Description: Will show no infection signs and symptoms  10/14/2021 0507 by Clifton Gomez RN  Outcome: Ongoing  10/13/2021 1610 by Darrell Meehan RN  Outcome: Ongoing  Goal: Absence of new skin breakdown  Description: Absence of new skin breakdown  10/14/2021 0507 by Clifton Gomez RN  Outcome: Ongoing  10/13/2021 1610 by Darrell Meehan RN  Outcome: Ongoing     Problem: Musculor/Skeletal Functional Status  Goal: Highest potential functional level  10/14/2021 0507 by Clifton Gomez RN  Outcome: Ongoing  10/13/2021 1610 by Darrell Meehan RN  Outcome: Ongoing

## 2021-10-14 NOTE — PROGRESS NOTES
[facility]  Office: [address], DO, Joanne Gurrola, DO, Carla Rubio, DO, Kang Contreras, DO, Mart Giordano MD, Rima Frances MD, Kennis Osler, MD, Ruba Ocasio MD, Carmela Mackey MD, Dania Wang MD, Ninoska Vela MD, Devika Orta MD, Neymar Cruz, DO, Mary Quezada, DO, Akua Zeng MD,  Oniel Masters DO, John Ponce MD, Chau Myers MD, Francisco Sanchez MD, Peg Guillaume MD, Nishi Alonso MD, Myriam Ervin MD, Leia Feliz [facility], [facility], CNP, Nicolette Garrison, CNP, Paty Yeh, CNS, Sander Chappell, CNP, Clay Dacosta, CNP, Saritha Norman, CNP, Carol Cancino, CNP, Mike Sanderson, CNP, Clemente Mackenzie, CNP, ROSEANNA DominguezC, Justice Earl, [facility], Laly Roy, CNP, Xenia Junior, CNP, Kelsey Bauer, CNP, Kimberly Martinez, CNP, Casie Abraham, CNP, Natanael Hager, CNP, Alyse Rosas, [address] [place] [address]    Progress Note    10/14/2021    1:07 PM    Name:   Eleuteiro Choi  MRN:     [id number]     [place]:      [de-identified]   Room:   [address] [place] [address] Day:  4  Admit Date:  10/10/2021  9:25 AM    PCP:   No primary care provider on file. Code Status:  Full Code    Subjective:     C/C: Abdominal pain    Interval History Status:   Patient is still in  [place] with RVR with variable rate. Cardiology was consulted and added digoxin and heparin drip. Echo was also ordered and is pending completion.        Brief History:     (per HPI) Isaac Heads a 61 y.o.  male who presents with   Right upper quadrant abdominal pain     51-year-old male transferred from [facility] and treatment of suspected choledocho lithiasis  Patient is status post cholecystectomy  He has been having right upper quadrant abdominal pain     Initial work-up in [place] revealed transaminasemia and concerns of choledocholithiasis  He apparently did require pressors for blood pressure support last PM  Results no available for review at this time     The daughter reports that the patient has been residing in a nursing facility for the last 3 years following a stroke    10/12/2021    Patient denies any abdominal pain today  MRCP negative for stone/obstruction, or GI probably he has passed the stone  Bilirubin and transaminases are trending down  GI has signed off  Patient appears to be in A. fib with RVR, got one-time dose of IV Lopressor earlier morning and is oral home medicines were on hold    Review of Systems:     Constitutional:  negative for chills, fevers, sweats  Respiratory:  negative for cough, dyspnea on exertion, shortness of breath, wheezing  Cardiovascular:  negative for chest pain, chest pressure/discomfort, lower extremity edema, palpitations  Gastrointestinal: Negative for abdominal pain, denies constipation, diarrhea, nausea, vomiting  Neurological:  negative for dizziness, headache    Medications:      Allergies:  No Known Allergies    Current Meds:   Scheduled Meds:    enoxaparin  1 mg/kg SubCUTAneous BID    dilTIAZem  180 mg Oral Daily    aspirin EC  81 mg Oral Daily    lisinopril  2.5 mg Oral Daily    metoprolol tartrate  50 mg Oral BID    amoxicillin-clavulanate  1 tablet Oral 2 times per day    furosemide  40 mg Oral Daily    sodium chloride flush  5-40 mL IntraVENous 2 times per day    phenytoin  30 mg Oral Daily    docusate sodium  100 mg Oral Daily    sodium chloride flush  5-40 mL IntraVENous BID    sodium chloride  500 mL IntraVENous Once     Continuous Infusions:    sodium chloride       PRN Meds: sodium chloride flush, sodium chloride, potassium chloride **OR** potassium alternative oral replacement **OR** potassium chloride, magnesium sulfate, ondansetron **OR** ondansetron, polyethylene glycol, acetaminophen **OR** acetaminophen    Data:     Past Medical History:   has a past medical history of Atrial fibrillation ([address] CAD (coronary artery disease), CHF (congestive heart failure) ([facility] Depression, Hypertension, Nonthrombocytopenic purpura ([facility] and Stroke (cerebrum) ([facility] Social History:   reports that he has quit smoking. He has never used smokeless tobacco. He reports that he does not drink alcohol and does not use drugs. Family History:   Family History   Problem Relation Age of Onset    No Known Problems Mother     Heart Disease Father        Vitals:  /63   Pulse 83   Temp 97.7 °F (36.5 °C) (Oral)   Resp 22   Ht 6' (1.829 m)   Wt 180 lb 6.4 oz (81.8 kg)   SpO2 93%   BMI 24.47 kg/m²   Temp (24hrs), Av °F (36.7 °C), Min:97.7 °F (36.5 °C), Max:98.4 °F (36.9 °C)    No results for input(s): POCGLU in the last 72 hours. I/O (24Hr):     Intake/Output Summary (Last 24 hours) at 10/14/2021 1307  Last data filed at 10/14/2021 1154  Gross per 24 hour   Intake 225 ml   Output 2200 ml   Net -1975 ml       Labs:  Hematology:  Recent Labs     10/12/21  0423 10/13/21  0545 10/14/21  0541   WBC 6.3 8.4 6.5   RBC 4.19* 4.48 4.49   HGB 13.2 14.2 14.2   HCT 42.5 45.9 44.6   .4 102.5 99.3   MCH 31.5 31.7 31.6   MCHC 31.1 30.9 31.8   RDW 14.4 14.2 14.1    217 256   MPV 11.5 11.2 11.2     Chemistry:  Recent Labs     10/12/21  0423 10/12/21  0423 10/12/21  2031 10/12/21  2031 10/13/21  0545 10/13/21  1146 10/13/21  2043 10/14/21  0541      < > 139   < > 137  --  139 137   K 3.7   < > 3.3*   < > 4.7  --  3.8 4.0      < > 102   < > 103  --  104 104   CO2 19*   < > 21   < > 18*  --  25 23   GLUCOSE 80   < > 73   < > 63*  --  122* 89   BUN 9   < > 8   < > 10  --  10 9   CREATININE 0.61*   < > 0.64*   < > 0.54*  --  0.69* 0.56*   MG 1.9  --  2.3  --   --   --   --   --    ANIONGAP 11   < > 16   < > 16  --  10 10   LABGLOM >60   < > >60   < > >60  --  >60 >60   GFRAA >60   < > >60   < > >60  --  >60 >60   CALCIUM 8.2*   < > 8.3*   < > 8.5*  --  8.4* 8.0*   PROBNP  --   --   --   --  4,301*  --   --   --    TROPHS  --   --   --   --   --  28*  --   -- < > = values in this interval not displayed. Recent Labs     10/13/21  0545 10/13/21  2043 10/14/21  0541   PROT 5.8* 6.0* 5.7*   LABALBU 2.8* 3.1* 2.7*   AST 56* 49* 50*   ALT 81* 89* 83*   ALKPHOS 202* 191* 168*   BILITOT 2.61* 2.20* 1.98*     ABG:No results found for: POCPH, PHART, PH, POCPCO2, VXY8BIQ, PCO2, POCPO2, PO2ART, PO2, POCHCO3, JRG2IQG, HCO3, NBEA, PBEA, BEART, BE, THGBART, THB, ETU3RYK, ZAEG3DQS, D8HFNWLE, O2SAT, FIO2  Lab Results   Component Value Date/Time    SPECIAL NOT REPORTED 10/10/2021 05:35 PM     Lab Results   Component Value Date/Time    CULTURE NO GROWTH 10/10/2021 05:35 PM       Radiology:  MRI ABDOMEN W WO CONTRAST MRCP    Result Date: 10/10/2021  1. Some technical limitations resulting in blurring of detail and misregistration artifact on a number of the imaging series. 2. Unremarkable appearance of the common bile duct/hepatic duct status post cholecystectomy. No stricture evident. 3. Minimal central hepatic duct dilatation is demonstrated, not unusual following cholecystectomy. Physical Examination:        General appearance: Ill-looking, breathing not labored, not able to communicate properly due to previous stroke  Mental Status:  Answers appropriately  Lungs:  + Bibasilar Rales, improving  Heart: Irregular rhythm, tachycardic, EKG confirmed A. fib with RVR  Abdomen: Negative for abdominal discomfort  Extremities:  no edema, redness, tenderness in the calves  Skin:  no gross lesions, rashes, induration    Assessment:        Hospital Problems         Last Modified POA    * (Principal) Choledocholithiasis 10/10/2021 Yes    Abdominal pain 10/9/2021 Yes    Hypotension due to hypovolemia 10/10/2021 Yes    History of completed stroke with residual left hemiparesis 10/10/2021 Yes    Left hemiparesis ([address] 10/10/2021 Yes    Essential hypertension 10/10/2021 Yes    Atrial fibrillation ([address] 10/10/2021 Yes    Coronary artery disease involving native coronary artery of native heart without angina pectoris 10/10/2021 Yes    Stroke (cerebrum) ([facility] 10/10/2021 Yes    Nonthrombocytopenic purpura ([facility] 10/10/2021 Yes    Obstructive jaundice 10/10/2021 Yes    Leukopenia 10/10/2021 Yes    Anemia, normocytic normochromic 10/10/2021 Yes    Elevated LFTs 10/11/2021 Yes    History of CHF (congestive heart failure) 10/12/2021 Yes          Plan:        1. Continue oral Lasix 40 mg daily  2. Continue oral Augmentin  3. Continue home dose of Cardizem CD and continue Lopressor 50 mg twice daily which is replacement of bisoprolol as per hospital formulary  4. Got one-time dose of digoxin by cardiology yesterday  5. Echo ordered, pending result cardiology may consider cath, currently patient on full therapeutic dose of Lovenox  6.  Cardiology on board,      He will be discharged back to SNF when hemodynamically stable     Ramana Loyd MD  10/14/2021  1:07 PM

## 2021-10-14 NOTE — PROGRESS NOTES
[facility]   Progress Note                   Date:   10/14/2021  Patient name: Brenda Nur  Date of admission:  10/10/2021  9:25 AM  MRN:   [id number]  YOB: 1955  PCP: No primary care provider on file. Reason for Admission: Abdominal pain [R10.9]    Subjective:       Clinical Changes / Abnormalities: Pt seen and examined in the room, with RN. Pt having some abdominal pain. No CP/sob. Afib, rate 110's. Medications:   Scheduled Meds:   enoxaparin  1 mg/kg SubCUTAneous BID    dilTIAZem  180 mg Oral Daily    aspirin EC  81 mg Oral Daily    lisinopril  2.5 mg Oral Daily    metoprolol tartrate  50 mg Oral BID    amoxicillin-clavulanate  1 tablet Oral 2 times per day    furosemide  40 mg Oral Daily    sodium chloride flush  5-40 mL IntraVENous 2 times per day    phenytoin  30 mg Oral Daily    docusate sodium  100 mg Oral Daily    sodium chloride flush  5-40 mL IntraVENous BID    sodium chloride  500 mL IntraVENous Once     Continuous Infusions:   sodium chloride       CBC:   Recent Labs     10/12/21  0423 10/13/21  0545 10/14/21  0541   WBC 6.3 8.4 6.5   HGB 13.2 14.2 14.2    217 256     BMP:    Recent Labs     10/13/21  0545 10/13/21  2043 10/14/21  0541    139 137   K 4.7 3.8 4.0    104 104   CO2 18* 25 23   BUN 10 10 9   CREATININE 0.54* 0.69* 0.56*   GLUCOSE 63* 122* 89     Hepatic:   Recent Labs     10/13/21  0545 10/13/21  2043 10/14/21  0541   AST 56* 49* 50*   ALT 81* 89* 83*   BILITOT 2.61* 2.20* 1.98*   ALKPHOS 202* 191* 168*     Troponin:   Recent Labs     10/13/21  1146   TROPHS 28*     BNP: No results for input(s): BNP in the last 72 hours. Lipids: No results for input(s): CHOL, HDL in the last 72 hours. Invalid input(s): LDLCALCU  INR: No results for input(s): INR in the last 72 hours.     Objective:   Vitals: /86   Pulse 108   Temp 97.8 °F (36.6 °C) (Oral)   Resp 22   Ht 6' (1.829 m)   Wt 180 lb 6.4 oz (81.8 kg) SpO2 96%   BMI 24.47 kg/m²   General appearance: alert and cooperative with exam  HEENT: Head: Normocephalic, no lesions, without obvious abnormality. Neck: no JVD, trachea midline, no adenopathy  Lungs: Clear to auscultation  Heart: irregular rate and rhythm, s1/s2 auscultated, no murmurs, afib   Abdomen: soft, non-tender, bowel sounds active  Extremities: no edema  Neurologic: not done        Assessment / Acute Cardiac Problems:   1. Afib with RVR   2. CAD     Patient Active Problem List:     Abdominal pain     Choledocholithiasis     Hypotension due to hypovolemia     History of completed stroke with residual left hemiparesis     Left hemiparesis (HCC)     Essential hypertension     Atrial fibrillation (HCC)     Coronary artery disease involving native coronary artery of native heart without angina pectoris     Stroke (cerebrum) (HCC)     Nonthrombocytopenic purpura (HCC)     Obstructive jaundice     Leukopenia     Anemia, normocytic normochromic     Elevated LFTs     History of CHF (congestive heart failure)    [id number] Score for Atrial Fibrillation Stroke Risk   Risk   Factors  Component Value   C CHF No 0   H HTN Yes 1   A2 Age >= 75 No,  (64 y.o.) 0   D DM No 0   S2 Prior Stroke/TIA Yes 2   V Vascular Disease No 0   A Age 74-69 No,  (64 y.o.) 0   Sc Sex male 0    [id number]  Score  3   Score last updated 10/14/21 3:31 AM EDT    Click here for a link to the UpToDate guideline \"Atrial Fibrillation: Anticoagulation therapy to prevent embolization    Disclaimer: Risk Score calculation is dependent on accuracy of patient problem list and past encounter diagnosis. Plan of Treatment:   1. Afib with rvr. Meds held for OR. Continue BB, CCB. IV dig yesterday. Will need to start Eliquis 5mg BID when ok with other services. 2. Await ECHO. May need ischemia workup  3. Will hold ACE due to soft BPs.       Electronically signed by FINA Frazier CNP on 10/14/2021 at 9:19 AM  [facility] [address] [facility].  [phone]

## 2021-10-14 NOTE — PROGRESS NOTES
Occupational [address]  Occupational Therapy Not Seen Note    DATE: 10/14/2021    NAME: Dalila Nyhan  MRN: [id number]   : 1955      Patient not seen this date for Occupational Therapy due to: Other:  supine in bed, not appropriate for therapy this day.          Electronically signed by Ivy PARKER on 10/14/2021 at 11:22 AM

## 2021-10-14 NOTE — PROGRESS NOTES
Physical Therapy  Facility/Department: [address] ORTHO/MED SURG  Daily Treatment Note  NAME: Mary Alice Hernandez  : 1955  MRN: [id number]    Date of Service: 10/14/2021    Discharge Recommendations:  Patient would benefit from continued therapy after discharge   PT Equipment Recommendations  Equipment Needed: No    Assessment   Body structures, Functions, Activity limitations: Decreased functional mobility ; Decreased strength;Decreased endurance  Assessment: Pt requires MAX A for all mobility. Treatment limited due to tachy. Recommend continued PT after d/c to address deficits  Prognosis: Good  REQUIRES PT FOLLOW UP: Yes  Activity Tolerance  Activity Tolerance: Patient limited by fatigue     Patient Diagnosis(es): There were no encounter diagnoses. has a past medical history of Atrial fibrillation ([facility] CAD (coronary artery disease), CHF (congestive heart failure) ([address] Depression, Hypertension, Nonthrombocytopenic purpura ([facility] and Stroke (cerebrum) ([facility] has a past surgical history that includes Cholecystectomy (2019); Gastrostomy tube placement (2019); tracheostomy; tracheostomy (2019); Knee arthroscopy; and Coronary angioplasty with stent (2017). Restrictions  Restrictions/Precautions  Restrictions/Precautions: Fall Risk  Required Braces or Orthoses?: No  Position Activity Restriction  Other position/activity restrictions: up with assist  Subjective   General  Response To Previous Treatment: Patient with no complaints from previous session.   Family / Caregiver Present: No  Subjective  Subjective: Pt restign in bed upon arrival, RN/pt agreeable to bed ex, due to afib, HR from 100, briefly up to 150 at rest  Pain Screening  Patient Currently in Pain: Denies  Vital Signs  Patient Currently in Pain: Denies       Orientation  Orientation  Overall Orientation Status: Impaired  Cognition      Objective   Bed mobility  Rolling to Left: Maximum assistance  Rolling to Right: Maximum assistance  Supine to Sit: Unable to assess (LAW due to tachy)  Scooting: Maximal assistance  Comment: rolling and scooting with MAX A/TD to position properly in bed   Exercise  L UE and L LE PROM in all planes x 10  R UE and R LE AAROM in all planes x 10     AM-PAC Score  AM-PAC Inpatient Mobility Raw Score : 8 (10/12/21 1213)  AM-PAC Inpatient T-Scale Score : 28.52 (10/12/21 1213)  Mobility Inpatient CMS 0-100% Score: 86.62 (10/12/21 1213)  Mobility Inpatient CMS G-Code Modifier : CM (10/12/21 1213)          Goals  Short term goals  Time Frame for Short term goals: 10 visits  Short term goal 1: transfers with min assist  Short term goal 2: bed to chair with min assist  Short term goal 3: 20 min exercise program with min assist  Short term goal 4: min assist with bed mobility    Plan    Plan  Times per week: 5-6x wk  Current Treatment Recommendations: Strengthening, Functional Mobility Training, Safety Education & Training, Endurance Training, Transfer Training, Balance Training, Positioning  Safety Devices  Type of devices: Nurse notified, Left in bed, Call light within reach, Bed alarm in place  Restraints  Initially in place: No     Therapy Time   Individual Concurrent Group Co-treatment   Time In 0936         Time Out 0959         Minutes 23         Timed Code Treatment Minutes: 213 [facility]

## 2021-10-15 LAB
ABSOLUTE EOS #: 0.69 K/UL (ref 0–0.44)
ABSOLUTE IMMATURE GRANULOCYTE: 0.04 K/UL (ref 0–0.3)
ABSOLUTE LYMPH #: 1.09 K/UL (ref 1.1–3.7)
ABSOLUTE MONO #: 0.74 K/UL (ref 0.1–1.2)
ALBUMIN SERPL-MCNC: 3 G/DL (ref 3.5–5.2)
ALBUMIN SERPL-MCNC: 3.1 G/DL (ref 3.5–5.2)
ALBUMIN/GLOBULIN RATIO: 1 (ref 1–2.5)
ALBUMIN/GLOBULIN RATIO: 1.1 (ref 1–2.5)
ALP BLD-CCNC: 152 U/L (ref 40–129)
ALP BLD-CCNC: 157 U/L (ref 40–129)
ALT SERPL-CCNC: 106 U/L (ref 5–41)
ALT SERPL-CCNC: 117 U/L (ref 5–41)
ANION GAP SERPL CALCULATED.3IONS-SCNC: 11 MMOL/L (ref 9–17)
ANION GAP SERPL CALCULATED.3IONS-SCNC: 12 MMOL/L (ref 9–17)
AST SERPL-CCNC: 65 U/L
AST SERPL-CCNC: 66 U/L
BASOPHILS # BLD: 1 % (ref 0–2)
BASOPHILS ABSOLUTE: 0.07 K/UL (ref 0–0.2)
BILIRUB SERPL-MCNC: 1.54 MG/DL (ref 0.3–1.2)
BILIRUB SERPL-MCNC: 1.91 MG/DL (ref 0.3–1.2)
BUN BLDV-MCNC: 10 MG/DL (ref 8–23)
BUN BLDV-MCNC: 10 MG/DL (ref 8–23)
BUN/CREAT BLD: ABNORMAL (ref 9–20)
BUN/CREAT BLD: ABNORMAL (ref 9–20)
CALCIUM SERPL-MCNC: 8.4 MG/DL (ref 8.6–10.4)
CALCIUM SERPL-MCNC: 8.5 MG/DL (ref 8.6–10.4)
CHLORIDE BLD-SCNC: 101 MMOL/L (ref 98–107)
CHLORIDE BLD-SCNC: 102 MMOL/L (ref 98–107)
CO2: 22 MMOL/L (ref 20–31)
CO2: 25 MMOL/L (ref 20–31)
CREAT SERPL-MCNC: 0.55 MG/DL (ref 0.7–1.2)
CREAT SERPL-MCNC: 0.55 MG/DL (ref 0.7–1.2)
DIFFERENTIAL TYPE: ABNORMAL
EOSINOPHILS RELATIVE PERCENT: 10 % (ref 1–4)
GFR AFRICAN AMERICAN: >60 ML/MIN
GFR AFRICAN AMERICAN: >60 ML/MIN
GFR NON-AFRICAN AMERICAN: >60 ML/MIN
GFR NON-AFRICAN AMERICAN: >60 ML/MIN
GFR SERPL CREATININE-BSD FRML MDRD: ABNORMAL ML/MIN/{1.73_M2}
GLUCOSE BLD-MCNC: 135 MG/DL (ref 70–99)
GLUCOSE BLD-MCNC: 80 MG/DL (ref 70–99)
HCT VFR BLD CALC: 46.6 % (ref 40.7–50.3)
HEMOGLOBIN: 14.9 G/DL (ref 13–17)
IMMATURE GRANULOCYTES: 1 %
LV EF: 55 %
LVEF MODALITY: NORMAL
LYMPHOCYTES # BLD: 16 % (ref 24–43)
MAGNESIUM: 2.1 MG/DL (ref 1.6–2.6)
MCH RBC QN AUTO: 31.6 PG (ref 25.2–33.5)
MCHC RBC AUTO-ENTMCNC: 32 G/DL (ref 28.4–34.8)
MCV RBC AUTO: 98.9 FL (ref 82.6–102.9)
MONOCYTES # BLD: 11 % (ref 3–12)
NRBC AUTOMATED: 0 PER 100 WBC
PDW BLD-RTO: 13.9 % (ref 11.8–14.4)
PLATELET # BLD: 185 K/UL (ref 138–453)
PLATELET ESTIMATE: ABNORMAL
PMV BLD AUTO: 11.1 FL (ref 8.1–13.5)
POTASSIUM SERPL-SCNC: 3.3 MMOL/L (ref 3.7–5.3)
POTASSIUM SERPL-SCNC: 3.7 MMOL/L (ref 3.7–5.3)
RBC # BLD: 4.71 M/UL (ref 4.21–5.77)
RBC # BLD: ABNORMAL 10*6/UL
SEG NEUTROPHILS: 61 % (ref 36–65)
SEGMENTED NEUTROPHILS ABSOLUTE COUNT: 4.29 K/UL (ref 1.5–8.1)
SODIUM BLD-SCNC: 136 MMOL/L (ref 135–144)
SODIUM BLD-SCNC: 137 MMOL/L (ref 135–144)
TOTAL PROTEIN: 5.9 G/DL (ref 6.4–8.3)
TOTAL PROTEIN: 5.9 G/DL (ref 6.4–8.3)
WBC # BLD: 6.9 K/UL (ref 3.5–11.3)
WBC # BLD: ABNORMAL 10*3/UL

## 2021-10-15 PROCEDURE — 6370000000 HC RX 637 (ALT 250 FOR IP): Performed by: INTERNAL MEDICINE

## 2021-10-15 PROCEDURE — 93306 TTE W/DOPPLER COMPLETE: CPT

## 2021-10-15 PROCEDURE — 97110 THERAPEUTIC EXERCISES: CPT

## 2021-10-15 PROCEDURE — 83735 ASSAY OF MAGNESIUM: CPT

## 2021-10-15 PROCEDURE — 97530 THERAPEUTIC ACTIVITIES: CPT

## 2021-10-15 PROCEDURE — 6370000000 HC RX 637 (ALT 250 FOR IP): Performed by: NURSE PRACTITIONER

## 2021-10-15 PROCEDURE — 99232 SBSQ HOSP IP/OBS MODERATE 35: CPT | Performed by: INTERNAL MEDICINE

## 2021-10-15 PROCEDURE — 1200000000 HC SEMI PRIVATE

## 2021-10-15 PROCEDURE — 76937 US GUIDE VASCULAR ACCESS: CPT

## 2021-10-15 PROCEDURE — 85025 COMPLETE CBC W/AUTO DIFF WBC: CPT

## 2021-10-15 PROCEDURE — 6360000002 HC RX W HCPCS: Performed by: INTERNAL MEDICINE

## 2021-10-15 PROCEDURE — 36415 COLL VENOUS BLD VENIPUNCTURE: CPT

## 2021-10-15 PROCEDURE — 2580000003 HC RX 258: Performed by: NURSE PRACTITIONER

## 2021-10-15 PROCEDURE — 80053 COMPREHEN METABOLIC PANEL: CPT

## 2021-10-15 RX ORDER — METOPROLOL TARTRATE 50 MG/1
100 TABLET, FILM COATED ORAL 2 TIMES DAILY
Status: DISCONTINUED | OUTPATIENT
Start: 2021-10-15 | End: 2021-10-17 | Stop reason: HOSPADM

## 2021-10-15 RX ADMIN — AMOXICILLIN AND CLAVULANATE POTASSIUM 1 TABLET: 875; 125 TABLET, FILM COATED ORAL at 08:39

## 2021-10-15 RX ADMIN — SODIUM CHLORIDE, PRESERVATIVE FREE 10 ML: 5 INJECTION INTRAVENOUS at 08:39

## 2021-10-15 RX ADMIN — DOCUSATE SODIUM 100 MG: 100 CAPSULE ORAL at 08:39

## 2021-10-15 RX ADMIN — AMOXICILLIN AND CLAVULANATE POTASSIUM 1 TABLET: 875; 125 TABLET, FILM COATED ORAL at 21:06

## 2021-10-15 RX ADMIN — DILTIAZEM HYDROCHLORIDE 180 MG: 180 CAPSULE, COATED, EXTENDED RELEASE ORAL at 08:39

## 2021-10-15 RX ADMIN — Medication 81 MG: at 08:39

## 2021-10-15 RX ADMIN — METOPROLOL TARTRATE 50 MG: 50 TABLET, FILM COATED ORAL at 08:39

## 2021-10-15 RX ADMIN — FUROSEMIDE 40 MG: 40 TABLET ORAL at 08:39

## 2021-10-15 RX ADMIN — ENOXAPARIN SODIUM 80 MG: 80 INJECTION SUBCUTANEOUS at 21:06

## 2021-10-15 RX ADMIN — LISINOPRIL 2.5 MG: 2.5 TABLET ORAL at 08:39

## 2021-10-15 RX ADMIN — ENOXAPARIN SODIUM 80 MG: 80 INJECTION SUBCUTANEOUS at 08:39

## 2021-10-15 RX ADMIN — METOPROLOL TARTRATE 100 MG: 50 TABLET, FILM COATED ORAL at 21:06

## 2021-10-15 RX ADMIN — EXTENDED PHENYTOIN SODIUM 30 MG: 30 CAPSULE ORAL at 08:39

## 2021-10-15 ASSESSMENT — PAIN SCALES - GENERAL
PAINLEVEL_OUTOF10: 0
PAINLEVEL_OUTOF10: 0

## 2021-10-15 NOTE — PLAN OF CARE
Problem: Infection:  Goal: Will remain free from infection  Description: Will remain free from infection  10/15/2021 1438 by Waqar Contreras RN  Outcome: Ongoing  10/15/2021 0654 by Jade Singh RN  Outcome: Ongoing     Problem: Safety:  Goal: Free from accidental physical injury  Description: Free from accidental physical injury  10/15/2021 1438 by Waqar Contreras RN  Outcome: Ongoing  10/15/2021 0654 by Jade Singh RN  Outcome: Ongoing  Goal: Free from intentional harm  Description: Free from intentional harm  10/15/2021 1438 by Waqar Contreras RN  Outcome: Ongoing  10/15/2021 0654 by Jade Singh RN  Outcome: Ongoing     Problem: Daily Care:  Goal: Daily care needs are met  Description: Daily care needs are met  10/15/2021 1438 by Waqar Contreras RN  Outcome: Ongoing  10/15/2021 0654 by Jade Singh RN  Outcome: Ongoing     Problem: Pain:  Goal: Patient's pain/discomfort is manageable  Description: Patient's pain/discomfort is manageable  10/15/2021 1438 by Waqar Contreras RN  Outcome: Ongoing  10/15/2021 0654 by Jade Singh RN  Outcome: Ongoing     Problem: Skin Integrity:  Goal: Skin integrity will stabilize  Description: Skin integrity will stabilize  10/15/2021 1438 by Waqar Contreras RN  Outcome: Ongoing  10/15/2021 0654 by Jade Singh RN  Outcome: Ongoing     Problem: Discharge Planning:  Goal: Patients continuum of care needs are met  Description: Patients continuum of care needs are met  10/15/2021 1438 by Waqar Contreras RN  Outcome: Ongoing  10/15/2021 0654 by Jade Singh RN  Outcome: Ongoing     Problem: Falls - Risk of:  Goal: Will remain free from falls  Description: Will remain free from falls  10/15/2021 1438 by Waqar Contreras RN  Outcome: Ongoing  10/15/2021 0654 by Jade Singh RN  Outcome: Ongoing  Goal: Absence of physical injury  Description: Absence of physical injury  10/15/2021 1438 by Waqar Contreras RN  Outcome: Ongoing  10/15/2021 0654 by Linh Gardner RN  Outcome: Ongoing     Problem: Skin Integrity:  Goal: Will show no infection signs and symptoms  Description: Will show no infection signs and symptoms  10/15/2021 1438 by Qamar Coleman RN  Outcome: Ongoing  10/15/2021 0654 by Linh Gardner RN  Outcome: Ongoing  Goal: Absence of new skin breakdown  Description: Absence of new skin breakdown  10/15/2021 1438 by Qamar Coleman RN  Outcome: Ongoing  10/15/2021 0654 by Linh Gardner RN  Outcome: Ongoing     Problem: Musculor/Skeletal Functional Status  Goal: Highest potential functional level  10/15/2021 1438 by Qamar Coleman RN  Outcome: Ongoing  10/15/2021 0654 by Linh Gardner RN  Outcome: Ongoing

## 2021-10-15 NOTE — PROGRESS NOTES
[facility]  Office: [address], DO, Jack Bess, DO, Rehan [facility], DO, Oli Morley Blood, DO, Shelia Billings MD, Mohinder Prince MD, Luis Kaufman MD, Jannette Brito MD, Deana Zuniga MD, Terry Mccray MD, Maxim Lpoez MD, Elisabeth Howell MD, Fidel Mendoza, DO, Amanda Hickman, DO, Sandy Alvarez MD,  Dain Spears DO, Lucie Kelly MD, James Waters MD, Mili Lauren MD, Aster Crump MD, Jewell Majano MD, Sung Mcdermott MD, Chauncey Lopez, [facility], [facility], [facility], Keith Fuller, CNP, Evan Lilly, [facility], Ammy Humphries, CNP, Zoey Downing, CNP, Sudhakar Wan, CNP, Baron Lesch, CNP, Jason Hassan, [facility], Saundra Carpenter, CNP, Mariam Perry PA-C, Denver Parks, [facility], John Bernard, CNP, Wilmer Booth, CNP, Fern Marti, CNP, Corbin Parish, CNP, Sung Jameson, CNP, Eri Seymour, CNP, Arlin Lafleur, [address] [place] [address]    Progress Note    10/15/2021    2:25 PM    Name:   Tamanna Waldrop  MRN:     [id number]     [place]:      [de-identified]   Room:   [address] [place] [address] Day:  5  Admit Date:  10/10/2021  9:25 AM    PCP:   No primary care provider on file. Code Status:  Full Code    Subjective:     C/C: Abdominal pain    Interval History Status:   Patient is still in  [address] [place] [address] with RVR with variable rate currently 8890. Echo was also ordered and is pending completion.    One-time digoxin dose was given by cardiology, waiting for echo to decide if he needs cath  He is on beta-blockers and Cardizem    Brief History:     (per HPI) Roxane Gloss a 61 y.o.  male who presents with   Right upper quadrant abdominal pain     68-year-old male transferred from [facility] and treatment of suspected choledocho lithiasis  Patient is status post cholecystectomy  He has been having right upper quadrant abdominal pain     Initial work-up in [employer] revealed transaminasemia and concerns of choledocholithiasis  He apparently did require pressors for blood pressure support last PM  Results no available for review at this time     The daughter reports that the patient has been residing in a nursing facility for the last 3 years following a stroke    10/12/2021    Patient denies any abdominal pain today  MRCP negative for stone/obstruction, or GI probably he has passed the stone  Bilirubin and transaminases are trending down  GI has signed off  Patient appears to be in A. fib with RVR, got one-time dose of IV Lopressor earlier morning and is oral home medicines were on hold    Review of Systems:     Constitutional:  negative for chills, fevers, sweats  Respiratory:  negative for cough, dyspnea on exertion, shortness of breath, wheezing  Cardiovascular:  negative for chest pain, chest pressure/discomfort, lower extremity edema, palpitations  Gastrointestinal: Negative for abdominal pain, denies constipation, diarrhea, nausea, vomiting  Neurological:  negative for dizziness, headache    Medications:      Allergies:  No Known Allergies    Current Meds:   Scheduled Meds:    metoprolol tartrate  100 mg Oral BID    enoxaparin  1 mg/kg SubCUTAneous BID    dilTIAZem  180 mg Oral Daily    aspirin EC  81 mg Oral Daily    amoxicillin-clavulanate  1 tablet Oral 2 times per day    furosemide  40 mg Oral Daily    sodium chloride flush  5-40 mL IntraVENous 2 times per day    phenytoin  30 mg Oral Daily    docusate sodium  100 mg Oral Daily    sodium chloride flush  5-40 mL IntraVENous BID    sodium chloride  500 mL IntraVENous Once     Continuous Infusions:    sodium chloride       PRN Meds: sodium chloride flush, sodium chloride, potassium chloride **OR** potassium alternative oral replacement **OR** potassium chloride, magnesium sulfate, ondansetron **OR** ondansetron, polyethylene glycol, acetaminophen **OR** acetaminophen    Data:     Past Medical History:   has a past medical history of Atrial fibrillation ([facility] [address] CAD (coronary artery disease), CHF (congestive heart failure) ([facility] [address] Depression, Hypertension, Nonthrombocytopenic purpura ([facility] [address] and Stroke (cerebrum) ([address] Social History:   reports that he has quit smoking. He has never used smokeless tobacco. He reports that he does not drink alcohol and does not use drugs. Family History:   Family History   Problem Relation Age of Onset    No Known Problems Mother     Heart Disease Father        Vitals:  /71   Pulse 88   Temp 97.8 °F (36.6 °C) (Oral)   Resp 20   Ht 6' (1.829 m)   Wt 180 lb 6.4 oz (81.8 kg)   SpO2 95%   BMI 24.47 kg/m²   Temp (24hrs), Av.9 °F (36.6 °C), Min:97.6 °F (36.4 °C), Max:98.3 °F (36.8 °C)    No results for input(s): POCGLU in the last 72 hours. I/O (24Hr):     Intake/Output Summary (Last 24 hours) at 10/15/2021 1425  Last data filed at 10/15/2021 1023  Gross per 24 hour   Intake    Output 750 ml   Net -750 ml       Labs:  Hematology:  Recent Labs     10/13/21  0545 10/14/21  0541 10/15/21  0542   WBC 8.4 6.5 6.9   RBC 4.48 4.49 4.71   HGB 14.2 14.2 14.9   HCT 45.9 44.6 46.6   .5 99.3 98.9   MCH 31.7 31.6 31.6   MCHC 30.9 31.8 32.0   RDW 14.2 14.1 13.9    256 185   MPV 11.2 11.2 11.1     Chemistry:  Recent Labs     10/12/21  2031 10/12/21  2031 10/13/21  0545 10/13/21  1146 10/13/21  2043 10/14/21  0541 10/14/21  2005 10/15/21  0602      < > 137  --    < > 137 137 136   K 3.3*   < > 4.7  --    < > 4.0 3.6* 3.7      < > 103  --    < > 104 102 102   CO2 21   < > 18*  --    < > 23 24 22   GLUCOSE 73   < > 63*  --    < > 89 91 80   BUN 8   < > 10  --    < > 9 9 10   CREATININE 0.64*   < > 0.54*  --    < > 0.56* 0.56* 0.55*   MG 2.3  --   --   --   --   --   --   --    ANIONGAP 16   < > 16  --    < > 10 11 12   LABGLOM >60   < > >60  --    < > >60 >60 >60   GFRAA >60   < > >60  --    < > >60 >60 >60   CALCIUM 8.3*   < > 8.5*  --    < > 8.0* 8.4* 8.4*   PROBNP  --   --  4,301*  --   --   --   --   --    TROPHS  --   -- --  28*  --   --   --   --     < > = values in this interval not displayed. Recent Labs     10/14/21  0541 10/14/21  2005 10/15/21  0602   PROT 5.7* 5.9* 5.9*   LABALBU 2.7* 3.1* 3.0*   AST 50* 58* 65*   ALT 83* 100* 106*   ALKPHOS 168* 165* 157*   BILITOT 1.98* 1.96* 1.91*     ABG:No results found for: POCPH, PHART, PH, POCPCO2, WGU1XIZ, PCO2, POCPO2, PO2ART, PO2, POCHCO3, OOY5YSA, HCO3, NBEA, PBEA, BEART, BE, THGBART, THB, PUJ3ZJN, JDLU8JNZ, J5UGMOWQ, O2SAT, FIO2  Lab Results   Component Value Date/Time    SPECIAL NOT REPORTED 10/10/2021 05:35 PM     Lab Results   Component Value Date/Time    CULTURE NO GROWTH 10/10/2021 05:35 PM       Radiology:  MRI ABDOMEN W WO CONTRAST MRCP    Result Date: 10/10/2021  1. Some technical limitations resulting in blurring of detail and misregistration artifact on a number of the imaging series. 2. Unremarkable appearance of the common bile duct/hepatic duct status post cholecystectomy. No stricture evident. 3. Minimal central hepatic duct dilatation is demonstrated, not unusual following cholecystectomy. Physical Examination:        General appearance: Ill-looking, breathing not labored, not able to communicate properly due to previous stroke  Mental Status:  Answers appropriately but slowly  Lungs:  + Bibasilar Rales, improved  Heart: Irregular rhythm, tachycardic, EKG confirmed A. fib with RVR  Abdomen: Negative for abdominal discomfort  Extremities:  no edema, redness, tenderness in the calves  Skin:  no gross lesions, rashes, induration    Assessment:        Hospital Problems         Last Modified POA    * (Principal) Choledocholithiasis 10/10/2021 Yes    Abdominal pain 10/9/2021 Yes    Hypotension due to hypovolemia 10/10/2021 Yes    History of completed stroke with residual left hemiparesis 10/10/2021 Yes    Left hemiparesis ([address] 10/10/2021 Yes    Essential hypertension 10/10/2021 Yes    Atrial fibrillation ([address] 10/10/2021 Yes    Coronary artery disease involving native coronary artery of native heart without angina pectoris 10/10/2021 Yes    Stroke (cerebrum) ([facility] [address] 10/10/2021 Yes    Nonthrombocytopenic purpura ([facility] [address] 10/10/2021 Yes    Obstructive jaundice 10/10/2021 Yes    Leukopenia 10/10/2021 Yes    Anemia, normocytic normochromic 10/10/2021 Yes    Elevated LFTs 10/11/2021 Yes    History of CHF (congestive heart failure) 10/12/2021 Yes          Plan:        1. Continue oral Lasix 40 mg daily  2. Continue oral Augmentin  3. Continue home dose of Cardizem CD and cardiology increased Lopressor 100 mg twice daily which is replacement of bisoprolol as per hospital formulary  4. Got one-time dose of digoxin by cardiology 2 days back  5. Echo ordered, pending result cardiology may consider cath, currently patient on full therapeutic dose of Lovenox  6.  Cardiology on board,      He will be discharged back to SNF when hemodynamically stable and cleared by cardiology    Paul Rajan MD  10/15/2021  2:25 PM

## 2021-10-15 NOTE — PROGRESS NOTES
hours.    Objective:   Vitals: /80   Pulse 118   Temp 97.6 °F (36.4 °C) (Oral)   Resp 18   Ht 6' (1.829 m)   Wt 180 lb 6.4 oz (81.8 kg)   SpO2 94%   BMI 24.47 kg/m²   General appearance: alert and cooperative with exam  HEENT: Head: Normocephalic, no lesions, without obvious abnormality. Neck: no JVD, trachea midline, no adenopathy  Lungs: Clear to auscultation  Heart: irregular rate and rhythm, s1/s2 auscultated, no murmurs, afib 90-140s   Abdomen: soft, non-tender, bowel sounds active  Extremities: no edema  Neurologic: not done        Assessment / Acute Cardiac Problems:   1. Afib with RVR   2. CAD   3. Abdominal pain with negative MRCP    Patient Active Problem List:     Abdominal pain     Choledocholithiasis     Hypotension due to hypovolemia     History of completed stroke with residual left hemiparesis     Left hemiparesis (HCC)     Essential hypertension     Atrial fibrillation (HCC)     Coronary artery disease involving native coronary artery of native heart without angina pectoris     Stroke (cerebrum) (HCC)     Nonthrombocytopenic purpura (HCC)     Obstructive jaundice     Leukopenia     Anemia, normocytic normochromic     Elevated LFTs     History of CHF (congestive heart failure)    [id number] Score for Atrial Fibrillation Stroke Risk   Risk   Factors  Component Value   C CHF No 0   H HTN Yes 1   A2 Age >= 75 No,  (64 y.o.) 0   D DM No 0   S2 Prior Stroke/TIA Yes 2   V Vascular Disease No 0   A Age 74-69 No,  (64 y.o.) 0   Sc Sex male 0    [id number]  Score  3   Score last updated 10/14/21 6:16 AM EDT    Click here for a link to the UpToDate guideline \"Atrial Fibrillation: Anticoagulation therapy to prevent embolization    Disclaimer: Risk Score calculation is dependent on accuracy of patient problem list and past encounter diagnosis. Plan of Treatment:   1. Remains Afib with episodes of RVR. Continue PO Cardizem and will increase BB. D/C ACE given soft BP at times.  Will need NOAC upon discharge FWB Lovenox for now pending need for ischemic work-up   2. Await ECHO.   May need ischemia workup       Electronically signed by FINA Hansen CNP on 10/15/2021 at 9:48 AM  [address]  [phone]

## 2021-10-15 NOTE — DISCHARGE SUMMARY
[facility]  Office: [address], DO, Tio Alvarez, DO, Boris Brower, DO, Ti Dow Blood, DO, Kayden Rosales MD, Trudy Adam MD, Madeline Felix MD, Jacki Campbell MD, Charles Marmolejo MD, Dejan Palumbo MD, Dianelys Haddad MD, Amanda Garcia MD, Cole Hodges, DO, Mónica Hernadez DO, Rick Davies MD,  Lila Galdamez DO, Tonja Snider MD, Maria De Jesus Perez MD, Sury Funk MD, Juan Pitts MD, Shell Webber MD, Iain Molina MD, Yan Cooper [facility], [facility], CNP, Yani Goodson, CNP, Марина Chaudhry, CNS, Kenna Doe, CNP, Soniya Alvarez, CNP, Gautam Parker, CNP, Heather Worley, CNP, Aayush Chavarria, CNP, Mickie Mcdonough, CNP, Renee Pyle PA-C, Charmel Osgood, [facility], Yoli De Souza, CNP, Malika Castellanos, CNP, Narda Rossi, CNP, Laya Galdamez, CNP, Tiney Schilder, CNP, Marlyne Osgood, CNP, [facility]    Discharge Summary     Patient ID: Jovani White  :  1955   MRN: [id number]     ACCOUNT:  [de-identified]   Patient's PCP: No primary care provider on file. Admit Date: 10/10/2021   Discharge Date: 10/16/2021   Length of Stay: 6  Code Status:  Full Code  Admitting Physician: No admitting provider for patient encounter.   Discharge Physician: Kaushik Watson MD     Active Discharge Diagnoses:     Hospital Problem Lists:  Principal Problem:    Choledocholithiasis  Active Problems:    Abdominal pain    Hypotension due to hypovolemia    History of completed stroke with residual left hemiparesis    Left hemiparesis ([facility] [address]    Essential hypertension    Atrial fibrillation ([facility] [address]    Coronary artery disease involving native coronary artery of native heart without angina pectoris    Stroke (cerebrum) (HCC)    Nonthrombocytopenic purpura (HCC)    Obstructive jaundice    Leukopenia    Anemia, normocytic normochromic    Elevated LFTs    History of CHF (congestive heart failure)  Resolved Problems:    * No resolved hospital problems. *      Admission Condition:  poor     Discharged Condition: stable    Hospital Stay:   Admitting history:  (per HPI) Ej Grijalva is a 61 y.o.  male who presents with   Right upper quadrant abdominal pain     82-year-old male transferred from [facility] and treatment of suspected choledocho lithiasis  Patient is status post cholecystectomy  He has been having right upper quadrant abdominal pain     Initial work-up in [place] revealed transaminasemia and concerns of choledocholithiasis  He apparently did require pressors for blood pressure support last PM  Results no available for review at this time     The daughter reports that the patient has been residing in a nursing facility for the last 3 years following a stroke     10/12/2021     Patient denies any abdominal pain today  MRCP negative for stone/obstruction, or GI probably he has passed the stone  Bilirubin and transaminases are trending down  GI has signed off  Patient appears to be in A. fib with RVR, got one-time dose of IV Lopressor earlier morning and is oral home medicines were on hold    Hospital Course:    Heart rate cooperative controlled with Cardizem and addition of beta-blockers  Patient had less than 2 seconds pauses x2, cardiology placed Holter monitor and okay with discharge  Patient is still in  A. fib with rate currently controlled 8890. Echo has EF 55%, no other significant abnormality. He is on beta-blockers and Cardizem    Plan:         1. Continue oral Lasix 40 mg daily  2. Continue oral Augmentin for 3 more days  3. Continue home dose of Cardizem CD and cardiology increased Lopressor 100 mg twice daily   4. Holter monitor has been placed by cardiology and switched to oral Eliquis  5.  Discharge to [facility] today      Significant therapeutic interventions: See above notes    Significant Diagnostic Studies:   Labs / Micro:  CBC:   Lab Results   Component Value Date    WBC 6.7 10/16/2021    RBC 4.78 10/16/2021    HGB 15.3 10/16/2021    HCT 46.1 10/16/2021    MCV 96.4 10/16/2021    MCH 32.0 10/16/2021    MCHC 33.2 10/16/2021    RDW 14.0 10/16/2021     10/16/2021     BMP:    Lab Results   Component Value Date    GLUCOSE 90 10/16/2021     10/16/2021    K 3.7 10/16/2021     10/16/2021    CO2 25 10/16/2021    ANIONGAP 12 10/16/2021    BUN 10 10/16/2021    CREATININE 0.55 10/16/2021    BUNCRER NOT REPORTED 10/16/2021    CALCIUM 8.6 10/16/2021    LABGLOM >60 10/16/2021    GFRAA >60 10/16/2021    GFR      10/16/2021    GFR NOT REPORTED 10/16/2021     HFP:    Lab Results   Component Value Date    PROT 6.2 10/16/2021     CMP:    Lab Results   Component Value Date    GLUCOSE 90 10/16/2021     10/16/2021    K 3.7 10/16/2021     10/16/2021    CO2 25 10/16/2021    BUN 10 10/16/2021    CREATININE 0.55 10/16/2021    ANIONGAP 12 10/16/2021    ALKPHOS 151 10/16/2021     10/16/2021    AST 72 10/16/2021    BILITOT 1.69 10/16/2021    LABALBU 3.2 10/16/2021    ALBUMIN 1.1 10/16/2021    LABGLOM >60 10/16/2021    GFRAA >60 10/16/2021    GFR      10/16/2021    GFR NOT REPORTED 10/16/2021    PROT 6.2 10/16/2021    CALCIUM 8.6 10/16/2021     PT/INR:    Lab Results   Component Value Date    PROTIME 11.6 10/10/2021    INR 1.1 10/10/2021     PTT: No results found for: APTT  FLP:  No results found for: CHOL, TRIG, HDL  U/A:  No results found for: COLORU, TURBIDITY, SPECGRAV, HGBUR, PHUR, PROTEINU, GLUCOSEU, KETUA, BILIRUBINUR, UROBILINOGEN, NITRU, LEUKOCYTESUR  TSH:  No results found for: TSH     Radiology:  XR CHEST PORTABLE    Result Date: 10/13/2021  No prior study available for comparison. Probable mild cardiomegaly and venous hypertension; no radiographic CHF. Opacity left base suggestive of volume loss and possible infiltrate/effusion. RECOMMENDATION: Follow-up PA chest without rotation when feasible. MRI ABDOMEN W WO CONTRAST MRCP    Result Date: 10/10/2021  1.  Some technical limitations resulting in blurring of detail and misregistration artifact on a number of the imaging series. 2. Unremarkable appearance of the common bile duct/hepatic duct status post cholecystectomy. No stricture evident. 3. Minimal central hepatic duct dilatation is demonstrated, not unusual following cholecystectomy. Consultations:    Consults:     Final Specialist Recommendations/Findings:   IP CONSULT TO GI  IP CONSULT TO CARDIOLOGY  IP CONSULT TO IV TEAM      The patient was seen and examined on day of discharge and this discharge summary is in conjunction with any daily progress note from day of discharge.     Discharge plan:     Disposition: [facility]    Physician Follow Up:   PCP within 1 week    Requiring Further Evaluation/Follow Up POST HOSPITALIZATION/Incidental Findings: Holter monitoring as recommended by cardiology    Diet: cardiac diet    Activity: As tolerated    Instructions to Patient: Needs to follow-up with cardiology as scheduled    Discharge Medications:      Medication List      START taking these medications    amoxicillin-clavulanate 875-125 MG per tablet  Commonly known as: AUGMENTIN  Take 1 tablet by mouth every 12 hours for 4 days     apixaban 5 MG Tabs tablet  Commonly known as: ELIQUIS  Take 1 tablet by mouth 2 times daily     metoprolol 100 MG tablet  Commonly known as: LOPRESSOR  Take 1 tablet by mouth 2 times daily        CONTINUE taking these medications    acetaminophen 325 MG tablet  Commonly known as: TYLENOL     aspirin EC 81 MG EC tablet     atorvastatin 40 MG tablet  Commonly known as: LIPITOR     dilTIAZem 180 MG extended release capsule  Commonly known as: CARDIZEM CD     docusate sodium 100 MG capsule  Commonly known as: COLACE     famotidine 20 MG tablet  Commonly known as: PEPCID     furosemide 40 MG tablet  Commonly known as: LASIX     phenytoin 30 MG ER capsule  Commonly known as: DILANTIN     vitamin D 25 MCG (1000 UT) Tabs tablet  Commonly known as: CHOLECALCIFEROL        STOP taking these medications    Claritin 10 MG capsule  Generic drug: loratadine     hydrOXYzine 10 MG tablet  Commonly known as: ATARAX     lisinopril 2.5 MG tablet  Commonly known as: PRINIVIL;ZESTRIL     mirtazapine 15 MG disintegrating tablet  Commonly known as: REMERON SOL-TAB     oxyCODONE-acetaminophen 5-325 MG per tablet  Commonly known as: PERCOCET     ZEBETA PO           Where to Get Your Medications      These medications were sent to [facility] [address] [facility], [address], [address] [address]    Phone: [phone]   · amoxicillin-clavulanate 875-125 MG per tablet  · apixaban 5 MG Tabs tablet  · metoprolol 100 MG tablet         No discharge procedures on file. Time Spent on discharge is  35 mins in patient examination, evaluation, counseling as well as medication reconciliation, prescriptions for required medications, discharge plan and follow up. Electronically signed by   Zoe Shore MD  10/16/2021  4:36 PM      Thank you Dr. Gonzales Can primary care provider on file. for the opportunity to be involved in this patient's care.

## 2021-10-15 NOTE — PROGRESS NOTES
Physical Therapy  Facility/Department: [address] ORTHO/MED SURG  Daily Treatment Note  NAME: Cristel Boles  : 1955  MRN: [id number]    Date of Service: 10/15/2021    Discharge Recommendations:  Patient would benefit from continued therapy after discharge   PT Equipment Recommendations  Equipment Needed: No    Assessment   Body structures, Functions, Activity limitations: Decreased functional mobility ; Decreased strength;Decreased endurance;Decreased balance;Decreased safe awareness;Decreased ROM  Assessment: Pt with mobility deficits requiring max-A for bed mobility and to maintain sitting at the EOB. Pt able to maintain sitting at the EOB x 8 minutes with max-A. Pt most limited secondary to decreased trunk control, decreased BLE strength, decreased endurance. Pt would benefit from additional therapy upon discharge. Prognosis: Good  Decision Making: Medium Complexity  PT Education: Goals;PT Role;Plan of Care;Transfer Training;General Safety; Functional Mobility Training  REQUIRES PT FOLLOW UP: Yes  Activity Tolerance  Activity Tolerance: Patient limited by fatigue  Activity Tolerance: Pt's heart rate increased to 107-120 bpm during sitting at the EOB     Patient Diagnosis(es): There were no encounter diagnoses. has a past medical history of Atrial fibrillation ([address] CAD (coronary artery disease), CHF (congestive heart failure) ([address] Depression, Hypertension, Nonthrombocytopenic purpura ([address] and Stroke (cerebrum) ([address] has a past surgical history that includes Cholecystectomy (2019); Gastrostomy tube placement (2019); tracheostomy; tracheostomy (2019); Knee arthroscopy; and Coronary angioplasty with stent (2017).     Restrictions  Restrictions/Precautions  Restrictions/Precautions: Fall Risk  Required Braces or Orthoses?: No  Position Activity Restriction  Other position/activity restrictions: up with assist  Subjective   General  Response To Previous Treatment: Patient with no complaints from previous session. Family / Caregiver Present: No  Subjective  Subjective: Pt supine in bed and agreeable to therapy, RN agreeable to therapy. Pt pleasant and cooperative throughout today's session. Pain Screening  Patient Currently in Pain: Denies  Vital Signs  Patient Currently in Pain: Denies       Cognition   Cognition  Overall Cognitive Status: Exceptions  Arousal/Alertness: Delayed responses to stimuli  Following Commands: Follows multistep commands with increased time  Problem Solving: Assistance required to generate solutions  Initiation: Requires cues for some  Sequencing: Requires cues for some  Objective   Bed mobility  Rolling to Right: Maximum assistance  Supine to Sit: Maximum assistance  Sit to Supine: Maximum assistance  Scooting: Maximal assistance  Comment: Bed mobility performed with the [facility] elevated ~30 degrees without use of handrails. Pt sat at the EOB x 8 minutes during today's session.   Transfers  Sit to Stand: Unable to assess  Ambulation  Ambulation?: No (pt reports using electric wheelchair at a baseline)  Stairs/Curb  Stairs?: No     Balance  Posture: Fair  Sitting - Static: Poor  Sitting - Dynamic: Poor  Comments: sitting balance assessed at the EOB, pt requires max-A to maintain sitting at the EOB due to heavy posterior and right sided lean  Exercises  Heelslides: x10 BLE AAROM  Knee Long Arc Quad: x10 BLE AAROM  Ankle Pumps: x10 BLE AAROM  Other exercises  Other exercises?: Yes  Other exercises 1: BLE gastroc stretch x30 seconds                              Goals  Short term goals  Time Frame for Short term goals: 10 visits  Short term goal 1: transfers with min assist  Short term goal 2: bed to chair with min assist  Short term goal 3: 20 min exercise program with min assist  Short term goal 4: min assist with bed mobility    Plan    Plan  Times per week: 5-6x wk  Current Treatment Recommendations: Strengthening, Functional Mobility Training, Safety Education & Training, Endurance Training, Transfer Training, Balance Training, Positioning, ROM, Home Exercise Program, Equipment Evaluation, Education, & procurement, Patient/Caregiver Education & Training  Safety Devices  Type of devices: Patient at risk for falls, Left in bed, Bed alarm in place, Call light within reach  Restraints  Initially in place: No     Therapy Time   Individual Concurrent Group Co-treatment   Time In 1518         Time Out 1542         Minutes 24         Timed Code Treatment Minutes: GERDA Jeffery 20, PT

## 2021-10-15 NOTE — PROGRESS NOTES
Occupational [address]  Occupational Therapy Not Seen Note    DATE: 10/15/2021    NAME: Tacho Wood  MRN: [id number]   : 1955      Patient not seen this date for Occupational Therapy due to:    Testing: Echo.          Electronically signed by KEITH Myers on 10/15/2021 at 2:56 PM

## 2021-10-15 NOTE — PROGRESS NOTES
Physical Therapy        Physical Therapy Cancel Note      DATE: 10/15/2021    NAME: Nas Lyles  MRN: [id number]   : 1955      Patient not seen this date for Physical Therapy due to:    Surgery/Procedure: Per RN, pt at [facility], will check back this afternoon as time permits or on .       Electronically signed by Edith Csatillo PT on 10/15/2021 at 1:35 PM

## 2021-10-16 VITALS
HEART RATE: 90 BPM | TEMPERATURE: 98.1 F | WEIGHT: 180 LBS | HEIGHT: 72 IN | OXYGEN SATURATION: 95 % | BODY MASS INDEX: 24.38 KG/M2 | SYSTOLIC BLOOD PRESSURE: 138 MMHG | DIASTOLIC BLOOD PRESSURE: 80 MMHG | RESPIRATION RATE: 16 BRPM

## 2021-10-16 LAB
ABSOLUTE EOS #: 0.45 K/UL (ref 0–0.44)
ABSOLUTE IMMATURE GRANULOCYTE: 0.03 K/UL (ref 0–0.3)
ABSOLUTE LYMPH #: 1.37 K/UL (ref 1.1–3.7)
ABSOLUTE MONO #: 0.56 K/UL (ref 0.1–1.2)
ALBUMIN SERPL-MCNC: 3.2 G/DL (ref 3.5–5.2)
ALBUMIN SERPL-MCNC: 3.2 G/DL (ref 3.5–5.2)
ALBUMIN/GLOBULIN RATIO: 1 (ref 1–2.5)
ALBUMIN/GLOBULIN RATIO: 1.1 (ref 1–2.5)
ALP BLD-CCNC: 148 U/L (ref 40–129)
ALP BLD-CCNC: 151 U/L (ref 40–129)
ALT SERPL-CCNC: 126 U/L (ref 5–41)
ALT SERPL-CCNC: 133 U/L (ref 5–41)
ANION GAP SERPL CALCULATED.3IONS-SCNC: 12 MMOL/L (ref 9–17)
ANION GAP SERPL CALCULATED.3IONS-SCNC: 9 MMOL/L (ref 9–17)
AST SERPL-CCNC: 72 U/L
AST SERPL-CCNC: 73 U/L
BASOPHILS # BLD: 1 % (ref 0–2)
BASOPHILS ABSOLUTE: 0.05 K/UL (ref 0–0.2)
BILIRUB SERPL-MCNC: 1.48 MG/DL (ref 0.3–1.2)
BILIRUB SERPL-MCNC: 1.69 MG/DL (ref 0.3–1.2)
BUN BLDV-MCNC: 10 MG/DL (ref 8–23)
BUN BLDV-MCNC: 11 MG/DL (ref 8–23)
BUN/CREAT BLD: ABNORMAL (ref 9–20)
BUN/CREAT BLD: ABNORMAL (ref 9–20)
CALCIUM SERPL-MCNC: 8.6 MG/DL (ref 8.6–10.4)
CALCIUM SERPL-MCNC: 8.6 MG/DL (ref 8.6–10.4)
CHLORIDE BLD-SCNC: 102 MMOL/L (ref 98–107)
CHLORIDE BLD-SCNC: 103 MMOL/L (ref 98–107)
CO2: 25 MMOL/L (ref 20–31)
CO2: 25 MMOL/L (ref 20–31)
CREAT SERPL-MCNC: 0.55 MG/DL (ref 0.7–1.2)
CREAT SERPL-MCNC: 0.65 MG/DL (ref 0.7–1.2)
DIFFERENTIAL TYPE: ABNORMAL
EOSINOPHILS RELATIVE PERCENT: 7 % (ref 1–4)
GFR AFRICAN AMERICAN: >60 ML/MIN
GFR AFRICAN AMERICAN: >60 ML/MIN
GFR NON-AFRICAN AMERICAN: >60 ML/MIN
GFR NON-AFRICAN AMERICAN: >60 ML/MIN
GFR SERPL CREATININE-BSD FRML MDRD: ABNORMAL ML/MIN/{1.73_M2}
GLUCOSE BLD-MCNC: 140 MG/DL (ref 70–99)
GLUCOSE BLD-MCNC: 90 MG/DL (ref 70–99)
HCT VFR BLD CALC: 46.1 % (ref 40.7–50.3)
HEMOGLOBIN: 15.3 G/DL (ref 13–17)
IMMATURE GRANULOCYTES: 0 %
LYMPHOCYTES # BLD: 21 % (ref 24–43)
MCH RBC QN AUTO: 32 PG (ref 25.2–33.5)
MCHC RBC AUTO-ENTMCNC: 33.2 G/DL (ref 28.4–34.8)
MCV RBC AUTO: 96.4 FL (ref 82.6–102.9)
MONOCYTES # BLD: 8 % (ref 3–12)
NRBC AUTOMATED: 0 PER 100 WBC
PDW BLD-RTO: 14 % (ref 11.8–14.4)
PLATELET # BLD: 165 K/UL (ref 138–453)
PLATELET ESTIMATE: ABNORMAL
PMV BLD AUTO: 11.4 FL (ref 8.1–13.5)
POTASSIUM SERPL-SCNC: 3.7 MMOL/L (ref 3.7–5.3)
POTASSIUM SERPL-SCNC: 4 MMOL/L (ref 3.7–5.3)
RBC # BLD: 4.78 M/UL (ref 4.21–5.77)
RBC # BLD: ABNORMAL 10*6/UL
SEG NEUTROPHILS: 63 % (ref 36–65)
SEGMENTED NEUTROPHILS ABSOLUTE COUNT: 4.22 K/UL (ref 1.5–8.1)
SODIUM BLD-SCNC: 137 MMOL/L (ref 135–144)
SODIUM BLD-SCNC: 139 MMOL/L (ref 135–144)
TOTAL PROTEIN: 6.2 G/DL (ref 6.4–8.3)
TOTAL PROTEIN: 6.3 G/DL (ref 6.4–8.3)
WBC # BLD: 6.7 K/UL (ref 3.5–11.3)
WBC # BLD: ABNORMAL 10*3/UL

## 2021-10-16 PROCEDURE — 6360000002 HC RX W HCPCS: Performed by: INTERNAL MEDICINE

## 2021-10-16 PROCEDURE — 6370000000 HC RX 637 (ALT 250 FOR IP): Performed by: INTERNAL MEDICINE

## 2021-10-16 PROCEDURE — 6370000000 HC RX 637 (ALT 250 FOR IP): Performed by: NURSE PRACTITIONER

## 2021-10-16 PROCEDURE — 99239 HOSP IP/OBS DSCHRG MGMT >30: CPT | Performed by: INTERNAL MEDICINE

## 2021-10-16 PROCEDURE — 85025 COMPLETE CBC W/AUTO DIFF WBC: CPT

## 2021-10-16 PROCEDURE — 2580000003 HC RX 258: Performed by: NURSE PRACTITIONER

## 2021-10-16 PROCEDURE — 80053 COMPREHEN METABOLIC PANEL: CPT

## 2021-10-16 PROCEDURE — 93225 XTRNL ECG REC<48 HRS REC: CPT

## 2021-10-16 PROCEDURE — 36415 COLL VENOUS BLD VENIPUNCTURE: CPT

## 2021-10-16 PROCEDURE — 93226 XTRNL ECG REC<48 HR SCAN A/R: CPT

## 2021-10-16 RX ORDER — AMOXICILLIN AND CLAVULANATE POTASSIUM 875; 125 MG/1; MG/1
1 TABLET, FILM COATED ORAL EVERY 12 HOURS SCHEDULED
Qty: 8 TABLET | Refills: 0 | Status: SHIPPED | OUTPATIENT
Start: 2021-10-16 | End: 2021-10-20

## 2021-10-16 RX ORDER — METOPROLOL TARTRATE 100 MG/1
100 TABLET ORAL 2 TIMES DAILY
Qty: 60 TABLET | Refills: 1 | Status: SHIPPED | OUTPATIENT
Start: 2021-10-16

## 2021-10-16 RX ADMIN — AMOXICILLIN AND CLAVULANATE POTASSIUM 1 TABLET: 875; 125 TABLET, FILM COATED ORAL at 20:03

## 2021-10-16 RX ADMIN — DOCUSATE SODIUM 100 MG: 100 CAPSULE ORAL at 08:11

## 2021-10-16 RX ADMIN — METOPROLOL TARTRATE 100 MG: 50 TABLET, FILM COATED ORAL at 20:03

## 2021-10-16 RX ADMIN — AMOXICILLIN AND CLAVULANATE POTASSIUM 1 TABLET: 875; 125 TABLET, FILM COATED ORAL at 08:11

## 2021-10-16 RX ADMIN — METOPROLOL TARTRATE 100 MG: 50 TABLET, FILM COATED ORAL at 08:11

## 2021-10-16 RX ADMIN — SODIUM CHLORIDE, PRESERVATIVE FREE 10 ML: 5 INJECTION INTRAVENOUS at 20:04

## 2021-10-16 RX ADMIN — EXTENDED PHENYTOIN SODIUM 30 MG: 30 CAPSULE ORAL at 08:11

## 2021-10-16 RX ADMIN — APIXABAN 5 MG: 5 TABLET, FILM COATED ORAL at 20:03

## 2021-10-16 RX ADMIN — ENOXAPARIN SODIUM 80 MG: 80 INJECTION SUBCUTANEOUS at 08:11

## 2021-10-16 RX ADMIN — SODIUM CHLORIDE, PRESERVATIVE FREE 10 ML: 5 INJECTION INTRAVENOUS at 08:11

## 2021-10-16 RX ADMIN — FUROSEMIDE 40 MG: 40 TABLET ORAL at 08:11

## 2021-10-16 RX ADMIN — Medication 81 MG: at 08:11

## 2021-10-16 RX ADMIN — POTASSIUM CHLORIDE 40 MEQ: 1500 TABLET, EXTENDED RELEASE ORAL at 08:10

## 2021-10-16 RX ADMIN — DILTIAZEM HYDROCHLORIDE 180 MG: 180 CAPSULE, COATED, EXTENDED RELEASE ORAL at 08:11

## 2021-10-16 ASSESSMENT — PAIN SCALES - GENERAL: PAINLEVEL_OUTOF10: 0

## 2021-10-16 NOTE — PROGRESS NOTES
[facility]   Progress Note                   Date:   10/16/2021  Patient name: Marleny Ward  Date of admission:  10/10/2021  9:25 AM  MRN:   [id number]  YOB: 1955  PCP: No primary care provider on file. Reason for Admission: Abdominal pain [R10.9]    Subjective:       Clinical Changes / Abnormalities: Pt seen and examined in the room, after discussion with RN. Pt remains afib. Pt has 2 pauses less then 2 secs overnight. Asymptomatic       Medications:   Scheduled Meds:   metoprolol tartrate  100 mg Oral BID    enoxaparin  1 mg/kg SubCUTAneous BID    dilTIAZem  180 mg Oral Daily    aspirin EC  81 mg Oral Daily    amoxicillin-clavulanate  1 tablet Oral 2 times per day    furosemide  40 mg Oral Daily    sodium chloride flush  5-40 mL IntraVENous 2 times per day    phenytoin  30 mg Oral Daily    docusate sodium  100 mg Oral Daily    sodium chloride flush  5-40 mL IntraVENous BID    sodium chloride  500 mL IntraVENous Once     Continuous Infusions:   sodium chloride       CBC:   Recent Labs     10/14/21  0541 10/15/21  0542 10/16/21  0710   WBC 6.5 6.9 6.7   HGB 14.2 14.9 15.3    185 165     BMP:    Recent Labs     10/15/21  0602 10/15/21  2021 10/16/21  0710    137 139   K 3.7 3.3* 3.7    101 102   CO2 22 25 25   BUN 10 10 10   CREATININE 0.55* 0.55* 0.55*   GLUCOSE 80 135* 90     Hepatic:   Recent Labs     10/15/21  0602 10/15/21  2021 10/16/21  0710   AST 65* 66* 72*   * 117* 126*   BILITOT 1.91* 1.54* 1.69*   ALKPHOS 157* 152* 151*     Troponin:   Recent Labs     10/13/21  1146   TROPHS 28*     BNP: No results for input(s): BNP in the last 72 hours. Lipids: No results for input(s): CHOL, HDL in the last 72 hours. Invalid input(s): LDLCALCU  INR: No results for input(s): INR in the last 72 hours.     Objective:   Vitals: BP (!) 127/91   Pulse 90   Temp 98.8 °F (37.1 °C) (Oral)   Resp 18   Ht 6' (1.829 m)   Wt 180 lb (81.6 kg) SpO2 94%   BMI 24.41 kg/m²   General appearance: alert and cooperative with exam  HEENT: Head: Normocephalic, no lesions, without obvious abnormality. Neck: no JVD, trachea midline, no adenopathy  Lungs: Clear to auscultation  Heart: irregular rate and rhythm, s1/s2 auscultated, no murmurs, afib 90  Abdomen: soft, non-tender, bowel sounds active  Extremities: no edema  Neurologic: not done    ECHO 10/15/21  Summary  Overall preserved LV systolic function, EF 04%. Reduced LV diastolic compliance, grade III. No significant valvular abnormalities. Assessment / Acute Cardiac Problems:   1. Afib with RVR   2. CAD   3. Abdominal pain with negative MRCP    Patient Active Problem List:     Abdominal pain     Choledocholithiasis     Hypotension due to hypovolemia     History of completed stroke with residual left hemiparesis     Left hemiparesis (HCC)     Essential hypertension     Atrial fibrillation (HCC)     Coronary artery disease involving native coronary artery of native heart without angina pectoris     Stroke (cerebrum) (HCC)     Nonthrombocytopenic purpura (HCC)     Obstructive jaundice     Leukopenia     Anemia, normocytic normochromic     Elevated LFTs     History of CHF (congestive heart failure)    [id number] Score for Atrial Fibrillation Stroke Risk   Risk   Factors  Component Value   C CHF No 0   H HTN Yes 1   A2 Age >= 75 No,  (64 y.o.) 0   D DM No 0   S2 Prior Stroke/TIA Yes 2   V Vascular Disease No 0   A Age 74-69 No,  (64 y.o.) 0   Sc Sex male 0    [id number]  Score  3   Score last updated 10/14/21 4:35 AM EDT    Click here for a link to the UpToDate guideline \"Atrial Fibrillation: Anticoagulation therapy to prevent embolization    Disclaimer: Risk Score calculation is dependent on accuracy of patient problem list and past encounter diagnosis. Plan of Treatment:   1. Afib. Rate controlled currently. Continue PO Cardizem and BB. Will start on Eliqus 5mg BID.   Episodes of bradycardia while sleeping. Will order holter monitor on discharge   2. ECHO reviewed and wnl.   3. Will sign off.  Please call with further questions or concerns        Electronically signed by FINA Shaffer CNP on 10/16/2021 at 8:38 AM  [address]  [phone]

## 2021-10-16 NOTE — PROGRESS NOTES
Patient has had two pauses, cardiology notified. Writer instructed to continue to monitor, no new orders at this time.

## 2021-10-16 NOTE — CARE COORDINATION
Transition planning  Called and updated Catia Zuniga at [address] [place] [address] that patient is not ready for discharge today. She states they are able to accept patient over the weekend if he is discharged. Her cell # is [phone] and fax# [phone].
Transitional Planning  Admissions from [address] [place] [address] called checking about discharge plans. Informed Cardiology consulted r/t heart rate and rhythm. They can accept when medically cleared.
Transitional planning:  Catia Zuniga and Terra Campoverde called from [address] [place] [address], states they are not on Epic. Need facesheet, H & P, therapy and nursing and progress notes faxed to [phone].     [facility] [address] [facility], can transport at 10 pm. Lex Does at [phone]. Catia Zuniga gave nurse report number to LUIS EDUARDO Josehp which is [phone]. Javi Juancho, pt's child to notify of transport time. Voiced understanding and she wants to speak with RN about pt's condition.    Keaton Mccoy RN, Betina's number on post it [phone]
Transitional planning:  Gautam Washington RN met with writer and states will need transport back to SNF.     [address] [employer], transportation form and medical necessity form to [facility], [employer] call. \"
[facility]. Called and spoke with Rebecca Owens at [facility], she states patient is a bed hold and is able to return to facility when discharged.           Electronically signed by Ann Lopez RN on 10/12/21 at 5:06 PM EDT

## 2021-10-16 NOTE — DISCHARGE INSTR - COC
Continuity of Care Form    Patient Name: Desi De Oliveira   :  1955  MRN:  [id number]    [facility] date:  10/10/2021  Discharge date:  10/16/2021    Code Status Order: Full Code   Advance Directives:      Admitting Physician:  No admitting provider for patient encounter. PCP: No primary care provider on file. [facility]  Discharging Unit Phone Number: [phone]    Emergency Contact:   Extended Emergency Contact Information  Primary Emergency Contact: [address], [address] Phone: [phone]  Relation: Child  Preferred language: English   needed? No  Secondary Emergency Contact: [address], [facility] Phone: [phone]  Relation: Child  Preferred language: English   needed? No    Past Surgical History:  Past Surgical History:   Procedure Laterality Date    CHOLECYSTECTOMY  2019    CORONARY ANGIOPLASTY WITH STENT PLACEMENT  2017    GASTROSTOMY TUBE PLACEMENT  2019    KNEE ARTHROSCOPY      TRACHEOSTOMY      TRACHEOSTOMY  2019       Immunization History: There is no immunization history on file for this patient.     Active Problems:  Patient Active Problem List   Diagnosis Code    Abdominal pain R10.9    Choledocholithiasis K80.50    Hypotension due to hypovolemia I95.89, E86.1    History of completed stroke with residual left hemiparesis Z86.73    Left hemiparesis ([facility]) G81.94    Essential hypertension I10    Atrial fibrillation ([facility]) I48.91    Coronary artery disease involving native coronary artery of native heart without angina pectoris I25.10    Stroke (cerebrum) ([facility]) I63.9    Nonthrombocytopenic purpura ([facility]) D69.2    Obstructive jaundice K83.1    Leukopenia D72.819    Anemia, normocytic normochromic D64.9    Elevated LFTs R79.89    History of CHF (congestive heart failure) Z86.79       Isolation/Infection:   Isolation            No Isolation          Patient Infection Status       None to display            Nurse Assessment:  Last Vital Signs: BP (!) 127/91   Pulse 90   Temp 98.8 °F (37.1 °C) (Oral)   Resp 18   Ht 6' (1.829 m)   Wt 180 lb (81.6 kg)   SpO2 94%   BMI 24.41 kg/m²     Last documented pain score (0-10 scale): Pain Level: 0  Last Weight:   Wt Readings from Last 1 Encounters:   10/16/21 180 lb (81.6 kg)     Mental Status:  oriented    IV Access:  - None    Nursing Mobility/ADLs:  Walking   Dependent  Transfer  Dependent  Bathing  Dependent  Dressing  Dependent  Toileting  Dependent  Feeding  Dependent  Med Admin  Dependent  Med Delivery   whole    Wound Care Documentation and Therapy:        Elimination:  Continence:   · Bowel: No  · Bladder: No  Urinary Catheter: None   Colostomy/Ileostomy/Ileal Conduit: No       Date of Last BM: 10/16/2021    Intake/Output Summary (Last 24 hours) at 10/16/2021 1039  Last data filed at 10/16/2021 0630  Gross per 24 hour   Intake 840 ml   Output 250 ml   Net 590 ml     I/O last 3 completed shifts: In: 840 [P.O.:840]  Out: 400 [Urine:400]    Safety Concerns: At Risk for Falls    Impairments/Disabilities:      Speech and Contractures - 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   Ventilator:    - No ventilator support    Rehab Therapies: Physical Therapy and Occupational Therapy  Weight Bearing Status/Restrictions: No weight bearing restirctions  Other Medical Equipment (for information only, NOT a DME order):  wheelchair  Other Treatments:48 hour holter monitorPatient's personal belongings (please select all that are sent with patient):  all belongings sent with pt    RN SIGNATURE:  Electronically signed by Rayray Bunn RN on 10/16/21 at 10:41 AM EDT    CASE MANAGEMENT/SOCIAL WORK SECTION    Inpatient Status Date: ***    Readmission Risk Assessment Score:  Readmission Risk              Risk of Unplanned Readmission:  13           Discharging to Facility/ Agency   Name:   [facility] [address] [place] [address] at [address] [address]       Phone: [phone]       Fax: [phone]        ·   ·     Dialysis Facility (if applicable)   · Name:  · Address:  · Dialysis Schedule:  · Phone:  · Fax:    / signature: Electronically signed by Franci Vera RN on 10/16/21 at 1:32 PM EDT    PHYSICIAN SECTION    Prognosis: Fair    Condition at Discharge: Stable    Rehab Potential (if transferring to Rehab): Fair    Recommended Labs or Other Treatments After Discharge: Follow-up with GI as scheduled, follow-up with cardiology as scheduled, Holter monitor to be returned as directed by cardiology    Physician Certification: I certify the above information and transfer of Henry Bowman  is necessary for the continuing treatment of the diagnosis listed and that he requires [facility] for greater 30 days.      Update Admission H&P: No change in H&P    PHYSICIAN SIGNATURE:  Electronically signed by Francisco Javier Arroyo MD on 10/16/21 at 12:09 PM EDT

## 2021-10-16 NOTE — PROGRESS NOTES
[facility]  Office: [address], DO, Teto Greenwood, DO, Tere Hickey, DO, Tiny Contreras, DO, Isabella Nguyen MD, Sukh Howard MD, Milad Ribeiro MD, Rebecca Bradshaw MD, Lila Worrell MD, Amor Parr MD, Ginger Bates MD, Christopher Beatty MD, [place] Officer, DO, Rick Wise, DO, Rachna De Leon MD,  March , DO, Danielle Valdivia MD, Alex Salmon MD, Christopher Bustillo MD, Una Yoo MD, Ileana Landry MD, Jim Frank MD, Sapna Guy, [facility], [facility], CNP, Vivian Shah, CNP, Eloy Miles, CNS, Kenneth Gamez, [facility], Leola Webster, CNP, Noris Stokes, CNP, Star Newton, CNP, Gearline Precise, CNP, Ange Sparrow, CNP, Wale Bracnh PA-C, Jackie Casas, [facility], Robyn Griggs, CNP, Malou Dave, CNP, Kiel Donnelly, CNP, Martina Jean, CNP, Garrett Martin, CNP, Kamari Polanco, CNP, [facility], [address] [place] [address]    Progress Note    10/16/2021    12:04 PM    Name:   Juan Montana  MRN:     [id number]     [place]:      [de-identified]   Room:   [address] [place] [address] Day:  6  Admit Date:  10/10/2021  9:25 AM    PCP:   No primary care provider on file. Code Status:  Full Code    Subjective:     C/C: Abdominal pain    Interval History Status:   Patient had less than 2 seconds pauses x2, cardiology placed Holter monitor and okay with discharge  Patient is still in  A. fib with rate currently controlled 8890. Echo has EF 55%, no other significant abnormality.    He is on beta-blockers and Cardizem    Brief History:     (per HPI) Janie Pop a 61 y.o.  male who presents with   Right upper quadrant abdominal pain     53-year-old male transferred from [facility] and treatment of suspected choledocho lithiasis  Patient is status post cholecystectomy  He has been having right upper quadrant abdominal pain     Initial work-up in [place] revealed transaminasemia and concerns of choledocholithiasis  He disease), CHF (congestive heart failure) ([facility] Depression, Hypertension, Nonthrombocytopenic purpura ([facility] and Stroke (cerebrum) ([facility] Social History:   reports that he has quit smoking. He has never used smokeless tobacco. He reports that he does not drink alcohol and does not use drugs. Family History:   Family History   Problem Relation Age of Onset    No Known Problems Mother     Heart Disease Father        Vitals:  BP (!) 127/91   Pulse 90   Temp 98.8 °F (37.1 °C) (Oral)   Resp 18   Ht 6' (1.829 m)   Wt 180 lb (81.6 kg)   SpO2 94%   BMI 24.41 kg/m²   Temp (24hrs), Av.1 °F (36.7 °C), Min:97.8 °F (36.6 °C), Max:98.8 °F (37.1 °C)    No results for input(s): POCGLU in the last 72 hours. I/O (24Hr):     Intake/Output Summary (Last 24 hours) at 10/16/2021 1204  Last data filed at 10/16/2021 0630  Gross per 24 hour   Intake 840 ml   Output 250 ml   Net 590 ml       Labs:  Hematology:  Recent Labs     10/14/21  0541 10/15/21  0542 10/16/21  0710   WBC 6.5 6.9 6.7   RBC 4.49 4.71 4.78   HGB 14.2 14.9 15.3   HCT 44.6 46.6 46.1   MCV 99.3 98.9 96.4   MCH 31.6 31.6 32.0   MCHC 31.8 32.0 33.2   RDW 14.1 13.9 14.0    185 165   MPV 11.2 11.1 11.4     Chemistry:  Recent Labs     10/15/21  0602 10/15/21  2021 10/16/21  0710    137 139   K 3.7 3.3* 3.7    101 102   CO2 22 25 25   GLUCOSE 80 135* 90   BUN 10 10 10   CREATININE 0.55* 0.55* 0.55*   MG  --  2.1  --    ANIONGAP 12 11 12   LABGLOM >60 >60 >60   GFRAA >60 >60 >60   CALCIUM 8.4* 8.5* 8.6     Recent Labs     10/15/21  0602 10/15/21  2021 10/16/21  0710   PROT 5.9* 5.9* 6.2*   LABALBU 3.0* 3.1* 3.2*   AST 65* 66* 72*   * 117* 126*   ALKPHOS 157* 152* 151*   BILITOT 1.91* 1.54* 1.69*     ABG:No results found for: POCPH, PHART, PH, POCPCO2, QCU3DNI, PCO2, POCPO2, PO2ART, PO2, POCHCO3, GVV0WJE, HCO3, NBEA, PBEA, BEART, BE, THGBART, THB, FCT2AQN, HOFX2YSP, D4TBKPYP, O2SAT, FIO2  Lab Results   Component Value Date/Time SPECIAL NOT REPORTED 10/10/2021 05:35 PM     Lab Results   Component Value Date/Time    CULTURE NO GROWTH 10/10/2021 05:35 PM       Radiology:  MRI ABDOMEN W WO CONTRAST MRCP    Result Date: 10/10/2021  1. Some technical limitations resulting in blurring of detail and misregistration artifact on a number of the imaging series. 2. Unremarkable appearance of the common bile duct/hepatic duct status post cholecystectomy. No stricture evident. 3. Minimal central hepatic duct dilatation is demonstrated, not unusual following cholecystectomy. Physical Examination:        General appearance: Ill-looking, breathing not labored, not able to communicate properly due to previous stroke, improving every day  Mental Status: Answers appropriately but slowly  Lungs: Occasional bibasilar Rales, improved  Heart: Irregular rhythm, no murmur  Abdomen: Negative for abdominal discomfort  Extremities:  no edema, redness, tenderness in the calves  Skin:  no gross lesions, rashes, induration    Assessment:        Hospital Problems         Last Modified POA    * (Principal) Choledocholithiasis 10/10/2021 Yes    Abdominal pain 10/9/2021 Yes    Hypotension due to hypovolemia 10/10/2021 Yes    History of completed stroke with residual left hemiparesis 10/10/2021 Yes    Left hemiparesis ([address] 10/10/2021 Yes    Essential hypertension 10/10/2021 Yes    Atrial fibrillation ([address] 10/10/2021 Yes    Coronary artery disease involving native coronary artery of native heart without angina pectoris 10/10/2021 Yes    Stroke (cerebrum) ([address] 10/10/2021 Yes    Nonthrombocytopenic purpura ([address] 10/10/2021 Yes    Obstructive jaundice 10/10/2021 Yes    Leukopenia 10/10/2021 Yes    Anemia, normocytic normochromic 10/10/2021 Yes    Elevated LFTs 10/11/2021 Yes    History of CHF (congestive heart failure) 10/12/2021 Yes          Plan:        1. Continue oral Lasix 40 mg daily  2. Continue oral Augmentin for 3 more days  3.  Continue home dose of Cardizem CD and cardiology increased Lopressor 100 mg twice daily   4. Holter monitor has been placed by cardiology and switched to oral Eliquis  5.  Discharge to [facility] today          Pati Juarez MD  10/16/2021  12:04 PM

## 2021-10-16 NOTE — FLOWSHEET NOTE
A 48 hour holter monitor was placed on pt today 10/16/21 at 12:00 pm and is due back 10/18/21. # [phone]. Oral and written instructions were given along with a [employer]  to send back.

## 2021-10-16 NOTE — PROGRESS NOTES
Physical Therapy        Physical Therapy Cancel Note      DATE: 10/16/2021    NAME: Laith Yee  MRN: [id number]   : 1955      Patient not seen this date for Physical Therapy due to: Other: Ptrefusing therapy , per RN pt will be dicharged to ECF today.       Electronically signed by Bentley Hancock PT on 10/16/2021 at 11:21 AM

## 2021-10-16 NOTE — PROGRESS NOTES
Occupational [address]  Occupational Therapy Not Seen Note    DATE: 10/16/2021    NAME: Mary Alice Hernandez  MRN: [id number]   : 1955      Patient not seen this date for Occupational Therapy due to:    Pt. Declined OT services, despite encouragement. Per pt. \"I will get therapy when I get back to the place I live\".     Electronically signed by KEITH Coreas on 10/16/2021 at 9:58 AM

## 2021-10-22 NOTE — PROCEDURES
[address] [facility] [address]                                 HOLTER MONITOR    PATIENT NAME: Dipak Rod                 :        1955  MED REC NO:   [id number]                             ROOM:       6349  ACCOUNT NO:   [de-identified]                           ADMIT DATE: 10/10/2021  PROVIDER:     Agus Boggs    HOLTER MONITOR 48-HOURS    DATE OF STUDY:  10/16/2021    INDICATIONS: Irregular Heart rate    ORDERING PROVIDER: KAREEN Almaraz    INTERPRETING PHYSICIAN: Dr. Santana File:  The patient is noted to be in Atrial Fibrillation on entire recording with intermittent RVR. A single Pause of 2.5 second noted     Occasional premature ventricular contractions with pairing.                [facility] [place]    D: 10/22/2021 14:49:20       T: 10/22/2021 14:50:34     /[id number]  Job#: [id number]     Doc#: Unknown    CC: